# Patient Record
Sex: FEMALE | Race: WHITE | NOT HISPANIC OR LATINO | Employment: UNEMPLOYED | URBAN - METROPOLITAN AREA
[De-identification: names, ages, dates, MRNs, and addresses within clinical notes are randomized per-mention and may not be internally consistent; named-entity substitution may affect disease eponyms.]

---

## 2018-03-18 ENCOUNTER — ANESTHESIA (OUTPATIENT)
Dept: SURGERY | Facility: HOSPITAL | Age: 58
DRG: 329 | End: 2018-03-18
Payer: COMMERCIAL

## 2018-03-18 ENCOUNTER — HOSPITAL ENCOUNTER (INPATIENT)
Facility: HOSPITAL | Age: 58
LOS: 4 days | Discharge: HOME | DRG: 329 | End: 2018-03-22
Attending: INTERNAL MEDICINE | Admitting: SURGERY
Payer: COMMERCIAL

## 2018-03-18 ENCOUNTER — ANESTHESIA EVENT (OUTPATIENT)
Dept: SURGERY | Facility: HOSPITAL | Age: 58
DRG: 329 | End: 2018-03-18
Payer: COMMERCIAL

## 2018-03-18 DIAGNOSIS — K63.1 SMALL BOWEL PERFORATION: ICD-10-CM

## 2018-03-18 DIAGNOSIS — N73.9 PELVIC ABSCESS IN FEMALE: ICD-10-CM

## 2018-03-18 DIAGNOSIS — Q43.0 MECKEL'S DIVERTICULUM: ICD-10-CM

## 2018-03-18 DIAGNOSIS — K63.2 SMALL BOWEL FISTULA: ICD-10-CM

## 2018-03-18 DIAGNOSIS — K52.9 GASTROENTERITIS, ACUTE: Primary | ICD-10-CM

## 2018-03-18 DIAGNOSIS — K63.2 ENTEROENTERIC FISTULA: ICD-10-CM

## 2018-03-18 DIAGNOSIS — N28.1 ACQUIRED RENAL CYST OF RIGHT KIDNEY: ICD-10-CM

## 2018-03-18 PROBLEM — K21.9 GERD (GASTROESOPHAGEAL REFLUX DISEASE): Status: ACTIVE | Noted: 2018-03-18

## 2018-03-18 PROBLEM — R51.9 HEADACHE: Status: ACTIVE | Noted: 2018-03-18

## 2018-03-18 LAB
ALBUMIN SERPL BCP-MCNC: 3.7 G/DL
ALP SERPL-CCNC: 75 U/L
ALT SERPL W/O P-5'-P-CCNC: 12 U/L
ANION GAP SERPL CALC-SCNC: 7 MMOL/L
AST SERPL-CCNC: 20 U/L
BASOPHILS # BLD AUTO: 0.01 K/UL
BASOPHILS NFR BLD: 0.1 %
BILIRUB SERPL-MCNC: 0.8 MG/DL
BILIRUB UR QL STRIP: NEGATIVE
BUN SERPL-MCNC: 12 MG/DL
CALCIUM SERPL-MCNC: 9.1 MG/DL
CHLORIDE SERPL-SCNC: 109 MMOL/L
CLARITY UR: CLEAR
CO2 SERPL-SCNC: 22 MMOL/L
COLOR UR: YELLOW
CREAT SERPL-MCNC: 0.8 MG/DL
DIFFERENTIAL METHOD: ABNORMAL
EOSINOPHIL # BLD AUTO: 0.1 K/UL
EOSINOPHIL NFR BLD: 0.8 %
ERYTHROCYTE [DISTWIDTH] IN BLOOD BY AUTOMATED COUNT: 14 %
EST. GFR  (AFRICAN AMERICAN): >60 ML/MIN/1.73 M^2
EST. GFR  (NON AFRICAN AMERICAN): >60 ML/MIN/1.73 M^2
GLUCOSE SERPL-MCNC: 96 MG/DL
GLUCOSE UR QL STRIP: NEGATIVE
HCT VFR BLD AUTO: 43.4 %
HGB BLD-MCNC: 15.1 G/DL
HGB UR QL STRIP: ABNORMAL
KETONES UR QL STRIP: NEGATIVE
LACTATE SERPL-SCNC: 0.7 MMOL/L
LEUKOCYTE ESTERASE UR QL STRIP: ABNORMAL
LIPASE SERPL-CCNC: 39 U/L
LYMPHOCYTES # BLD AUTO: 1.6 K/UL
LYMPHOCYTES NFR BLD: 10.8 %
MAGNESIUM SERPL-MCNC: 2.2 MG/DL
MCH RBC QN AUTO: 31.5 PG
MCHC RBC AUTO-ENTMCNC: 34.8 G/DL
MCV RBC AUTO: 90 FL
MICROSCOPIC COMMENT: NORMAL
MONOCYTES # BLD AUTO: 1.5 K/UL
MONOCYTES NFR BLD: 10.4 %
NEUTROPHILS # BLD AUTO: 11.4 K/UL
NEUTROPHILS NFR BLD: 77.9 %
NITRITE UR QL STRIP: NEGATIVE
PH UR STRIP: 7 [PH] (ref 5–8)
PLATELET # BLD AUTO: 139 K/UL
PMV BLD AUTO: 9 FL
POTASSIUM SERPL-SCNC: 3.8 MMOL/L
PROT SERPL-MCNC: 6.8 G/DL
PROT UR QL STRIP: NEGATIVE
RBC # BLD AUTO: 4.8 M/UL
RBC #/AREA URNS HPF: 3 /HPF (ref 0–4)
SODIUM SERPL-SCNC: 138 MMOL/L
SP GR UR STRIP: 1.01 (ref 1–1.03)
SQUAMOUS #/AREA URNS HPF: 4 /HPF
URN SPEC COLLECT METH UR: ABNORMAL
UROBILINOGEN UR STRIP-ACNC: NEGATIVE EU/DL
WBC # BLD AUTO: 14.66 K/UL
WBC #/AREA URNS HPF: 0 /HPF (ref 0–5)

## 2018-03-18 PROCEDURE — S0020 INJECTION, BUPIVICAINE HYDRO: HCPCS | Performed by: SURGERY

## 2018-03-18 PROCEDURE — 96361 HYDRATE IV INFUSION ADD-ON: CPT

## 2018-03-18 PROCEDURE — 83735 ASSAY OF MAGNESIUM: CPT

## 2018-03-18 PROCEDURE — 83690 ASSAY OF LIPASE: CPT

## 2018-03-18 PROCEDURE — 88307 TISSUE EXAM BY PATHOLOGIST: CPT | Performed by: PATHOLOGY

## 2018-03-18 PROCEDURE — 63600175 PHARM REV CODE 636 W HCPCS: Performed by: INTERNAL MEDICINE

## 2018-03-18 PROCEDURE — 44120 REMOVAL OF SMALL INTESTINE: CPT | Mod: ,,, | Performed by: SURGERY

## 2018-03-18 PROCEDURE — 36000709 HC OR TIME LEV III EA ADD 15 MIN: Performed by: SURGERY

## 2018-03-18 PROCEDURE — 94760 N-INVAS EAR/PLS OXIMETRY 1: CPT

## 2018-03-18 PROCEDURE — 25000003 PHARM REV CODE 250: Performed by: INTERNAL MEDICINE

## 2018-03-18 PROCEDURE — 36000708 HC OR TIME LEV III 1ST 15 MIN: Performed by: SURGERY

## 2018-03-18 PROCEDURE — 25500020 PHARM REV CODE 255: Performed by: INTERNAL MEDICINE

## 2018-03-18 PROCEDURE — 81000 URINALYSIS NONAUTO W/SCOPE: CPT

## 2018-03-18 PROCEDURE — 96374 THER/PROPH/DIAG INJ IV PUSH: CPT

## 2018-03-18 PROCEDURE — S0030 INJECTION, METRONIDAZOLE: HCPCS | Performed by: SURGERY

## 2018-03-18 PROCEDURE — 83605 ASSAY OF LACTIC ACID: CPT

## 2018-03-18 PROCEDURE — 36415 COLL VENOUS BLD VENIPUNCTURE: CPT

## 2018-03-18 PROCEDURE — 44800 EXCISION OF BOWEL POUCH: CPT | Mod: 51,,, | Performed by: SURGERY

## 2018-03-18 PROCEDURE — 88307 TISSUE EXAM BY PATHOLOGIST: CPT | Mod: 26,,, | Performed by: PATHOLOGY

## 2018-03-18 PROCEDURE — 63600175 PHARM REV CODE 636 W HCPCS: Performed by: NURSE ANESTHETIST, CERTIFIED REGISTERED

## 2018-03-18 PROCEDURE — 71000039 HC RECOVERY, EACH ADD'L HOUR: Performed by: SURGERY

## 2018-03-18 PROCEDURE — 94799 UNLISTED PULMONARY SVC/PX: CPT

## 2018-03-18 PROCEDURE — 96376 TX/PRO/DX INJ SAME DRUG ADON: CPT

## 2018-03-18 PROCEDURE — C9290 INJ, BUPIVACAINE LIPOSOME: HCPCS | Performed by: SURGERY

## 2018-03-18 PROCEDURE — S0028 INJECTION, FAMOTIDINE, 20 MG: HCPCS | Performed by: SURGERY

## 2018-03-18 PROCEDURE — 25000003 PHARM REV CODE 250: Performed by: NURSE ANESTHETIST, CERTIFIED REGISTERED

## 2018-03-18 PROCEDURE — 21400001 HC TELEMETRY ROOM

## 2018-03-18 PROCEDURE — 85025 COMPLETE CBC W/AUTO DIFF WBC: CPT

## 2018-03-18 PROCEDURE — 63600175 PHARM REV CODE 636 W HCPCS: Performed by: SURGERY

## 2018-03-18 PROCEDURE — 63600175 PHARM REV CODE 636 W HCPCS: Performed by: ANESTHESIOLOGY

## 2018-03-18 PROCEDURE — 37000009 HC ANESTHESIA EA ADD 15 MINS: Performed by: SURGERY

## 2018-03-18 PROCEDURE — 25000003 PHARM REV CODE 250: Performed by: SURGERY

## 2018-03-18 PROCEDURE — 71000033 HC RECOVERY, INTIAL HOUR: Performed by: SURGERY

## 2018-03-18 PROCEDURE — 37000008 HC ANESTHESIA 1ST 15 MINUTES: Performed by: SURGERY

## 2018-03-18 PROCEDURE — 80053 COMPREHEN METABOLIC PANEL: CPT

## 2018-03-18 PROCEDURE — 99222 1ST HOSP IP/OBS MODERATE 55: CPT | Mod: 57,,, | Performed by: SURGERY

## 2018-03-18 PROCEDURE — 99900035 HC TECH TIME PER 15 MIN (STAT)

## 2018-03-18 PROCEDURE — 27201423 OPTIME MED/SURG SUP & DEVICES STERILE SUPPLY: Performed by: SURGERY

## 2018-03-18 PROCEDURE — 99285 EMERGENCY DEPT VISIT HI MDM: CPT | Mod: 25

## 2018-03-18 PROCEDURE — 96375 TX/PRO/DX INJ NEW DRUG ADDON: CPT

## 2018-03-18 PROCEDURE — 27000221 HC OXYGEN, UP TO 24 HOURS

## 2018-03-18 PROCEDURE — 11000001 HC ACUTE MED/SURG PRIVATE ROOM

## 2018-03-18 PROCEDURE — 94770 HC EXHALED C02 TEST: CPT

## 2018-03-18 PROCEDURE — 0DBB0ZZ EXCISION OF ILEUM, OPEN APPROACH: ICD-10-PCS | Performed by: SURGERY

## 2018-03-18 RX ORDER — DIPHENHYDRAMINE HYDROCHLORIDE 50 MG/ML
25 INJECTION INTRAMUSCULAR; INTRAVENOUS EVERY 4 HOURS PRN
Status: DISCONTINUED | OUTPATIENT
Start: 2018-03-18 | End: 2018-03-22 | Stop reason: HOSPADM

## 2018-03-18 RX ORDER — CLONIDINE HYDROCHLORIDE 0.1 MG/1
0.1 TABLET ORAL EVERY 6 HOURS PRN
Status: DISCONTINUED | OUTPATIENT
Start: 2018-03-18 | End: 2018-03-22 | Stop reason: HOSPADM

## 2018-03-18 RX ORDER — LIDOCAINE HYDROCHLORIDE 20 MG/ML
INJECTION, SOLUTION EPIDURAL; INFILTRATION; INTRACAUDAL; PERINEURAL
Status: DISCONTINUED | OUTPATIENT
Start: 2018-03-18 | End: 2018-03-18

## 2018-03-18 RX ORDER — CHLORHEXIDINE GLUCONATE ORAL RINSE 1.2 MG/ML
10 SOLUTION DENTAL 2 TIMES DAILY
Status: DISCONTINUED | OUTPATIENT
Start: 2018-03-18 | End: 2018-03-22 | Stop reason: HOSPADM

## 2018-03-18 RX ORDER — ACETAMINOPHEN 325 MG/1
650 TABLET ORAL EVERY 6 HOURS PRN
Status: DISCONTINUED | OUTPATIENT
Start: 2018-03-18 | End: 2018-03-22 | Stop reason: HOSPADM

## 2018-03-18 RX ORDER — NALOXONE HCL 0.4 MG/ML
0.02 VIAL (ML) INJECTION
Status: DISCONTINUED | OUTPATIENT
Start: 2018-03-18 | End: 2018-03-21

## 2018-03-18 RX ORDER — DIPHENHYDRAMINE HYDROCHLORIDE 50 MG/ML
25 INJECTION INTRAMUSCULAR; INTRAVENOUS EVERY 6 HOURS PRN
Status: DISCONTINUED | OUTPATIENT
Start: 2018-03-18 | End: 2018-03-18 | Stop reason: HOSPADM

## 2018-03-18 RX ORDER — PROPOFOL 10 MG/ML
VIAL (ML) INTRAVENOUS
Status: DISCONTINUED | OUTPATIENT
Start: 2018-03-18 | End: 2018-03-18

## 2018-03-18 RX ORDER — SODIUM CHLORIDE 9 MG/ML
1000 INJECTION, SOLUTION INTRAVENOUS
Status: DISCONTINUED | OUTPATIENT
Start: 2018-03-18 | End: 2018-03-18

## 2018-03-18 RX ORDER — FAMOTIDINE 20 MG/50ML
20 INJECTION, SOLUTION INTRAVENOUS EVERY 12 HOURS
Status: DISCONTINUED | OUTPATIENT
Start: 2018-03-18 | End: 2018-03-21

## 2018-03-18 RX ORDER — HYDROMORPHONE HYDROCHLORIDE 2 MG/ML
0.2 INJECTION, SOLUTION INTRAMUSCULAR; INTRAVENOUS; SUBCUTANEOUS EVERY 5 MIN PRN
Status: DISCONTINUED | OUTPATIENT
Start: 2018-03-18 | End: 2018-03-18 | Stop reason: HOSPADM

## 2018-03-18 RX ORDER — HYDROMORPHONE HCL IN 0.9% NACL 6 MG/30 ML
PATIENT CONTROLLED ANALGESIA SYRINGE INTRAVENOUS CONTINUOUS
Status: DISCONTINUED | OUTPATIENT
Start: 2018-03-18 | End: 2018-03-21

## 2018-03-18 RX ORDER — ONDANSETRON 2 MG/ML
INJECTION INTRAMUSCULAR; INTRAVENOUS
Status: DISCONTINUED | OUTPATIENT
Start: 2018-03-18 | End: 2018-03-18

## 2018-03-18 RX ORDER — ENOXAPARIN SODIUM 100 MG/ML
40 INJECTION SUBCUTANEOUS EVERY 24 HOURS
Status: DISCONTINUED | OUTPATIENT
Start: 2018-03-19 | End: 2018-03-22 | Stop reason: HOSPADM

## 2018-03-18 RX ORDER — MIDAZOLAM HYDROCHLORIDE 1 MG/ML
INJECTION, SOLUTION INTRAMUSCULAR; INTRAVENOUS
Status: DISCONTINUED | OUTPATIENT
Start: 2018-03-18 | End: 2018-03-18

## 2018-03-18 RX ORDER — SODIUM CHLORIDE 0.9 % (FLUSH) 0.9 %
3 SYRINGE (ML) INJECTION
Status: DISCONTINUED | OUTPATIENT
Start: 2018-03-18 | End: 2018-03-22 | Stop reason: HOSPADM

## 2018-03-18 RX ORDER — ROCURONIUM BROMIDE 10 MG/ML
INJECTION, SOLUTION INTRAVENOUS
Status: DISCONTINUED | OUTPATIENT
Start: 2018-03-18 | End: 2018-03-18

## 2018-03-18 RX ORDER — HYDROMORPHONE HYDROCHLORIDE 1 MG/ML
1 INJECTION, SOLUTION INTRAMUSCULAR; INTRAVENOUS; SUBCUTANEOUS
Status: COMPLETED | OUTPATIENT
Start: 2018-03-18 | End: 2018-03-18

## 2018-03-18 RX ORDER — INSULIN ASPART 100 [IU]/ML
0-5 INJECTION, SOLUTION INTRAVENOUS; SUBCUTANEOUS EVERY 6 HOURS PRN
Status: DISCONTINUED | OUTPATIENT
Start: 2018-03-18 | End: 2018-03-22 | Stop reason: HOSPADM

## 2018-03-18 RX ORDER — BUPIVACAINE HYDROCHLORIDE 5 MG/ML
INJECTION, SOLUTION EPIDURAL; INTRACAUDAL
Status: DISCONTINUED | OUTPATIENT
Start: 2018-03-18 | End: 2018-03-18 | Stop reason: HOSPADM

## 2018-03-18 RX ORDER — RAMELTEON 8 MG/1
8 TABLET ORAL NIGHTLY PRN
Status: DISCONTINUED | OUTPATIENT
Start: 2018-03-18 | End: 2018-03-22 | Stop reason: HOSPADM

## 2018-03-18 RX ORDER — MEPERIDINE HYDROCHLORIDE 50 MG/ML
12.5 INJECTION INTRAMUSCULAR; INTRAVENOUS; SUBCUTANEOUS EVERY 10 MIN PRN
Status: DISCONTINUED | OUTPATIENT
Start: 2018-03-18 | End: 2018-03-18 | Stop reason: HOSPADM

## 2018-03-18 RX ORDER — PROMETHAZINE HYDROCHLORIDE 25 MG/1
25 SUPPOSITORY RECTAL EVERY 6 HOURS PRN
Status: DISCONTINUED | OUTPATIENT
Start: 2018-03-18 | End: 2018-03-22 | Stop reason: HOSPADM

## 2018-03-18 RX ORDER — NEOSTIGMINE METHYLSULFATE 1 MG/ML
INJECTION, SOLUTION INTRAVENOUS
Status: DISCONTINUED | OUTPATIENT
Start: 2018-03-18 | End: 2018-03-18

## 2018-03-18 RX ORDER — GLYCOPYRROLATE 0.2 MG/ML
INJECTION INTRAMUSCULAR; INTRAVENOUS
Status: DISCONTINUED | OUTPATIENT
Start: 2018-03-18 | End: 2018-03-18

## 2018-03-18 RX ORDER — GLUCAGON 1 MG
1 KIT INJECTION
Status: DISCONTINUED | OUTPATIENT
Start: 2018-03-18 | End: 2018-03-22 | Stop reason: HOSPADM

## 2018-03-18 RX ORDER — SODIUM CHLORIDE 0.9 % (FLUSH) 0.9 %
3 SYRINGE (ML) INJECTION EVERY 8 HOURS
Status: DISCONTINUED | OUTPATIENT
Start: 2018-03-18 | End: 2018-03-18 | Stop reason: HOSPADM

## 2018-03-18 RX ORDER — ONDANSETRON 2 MG/ML
4 INJECTION INTRAMUSCULAR; INTRAVENOUS EVERY 8 HOURS PRN
Status: DISCONTINUED | OUTPATIENT
Start: 2018-03-18 | End: 2018-03-22 | Stop reason: HOSPADM

## 2018-03-18 RX ORDER — SODIUM CHLORIDE, SODIUM LACTATE, POTASSIUM CHLORIDE, CALCIUM CHLORIDE 600; 310; 30; 20 MG/100ML; MG/100ML; MG/100ML; MG/100ML
INJECTION, SOLUTION INTRAVENOUS CONTINUOUS
Status: DISCONTINUED | OUTPATIENT
Start: 2018-03-18 | End: 2018-03-20

## 2018-03-18 RX ORDER — SODIUM CHLORIDE 9 MG/ML
INJECTION, SOLUTION INTRAVENOUS CONTINUOUS PRN
Status: DISCONTINUED | OUTPATIENT
Start: 2018-03-18 | End: 2018-03-18

## 2018-03-18 RX ORDER — ONDANSETRON 2 MG/ML
4 INJECTION INTRAMUSCULAR; INTRAVENOUS DAILY PRN
Status: DISCONTINUED | OUTPATIENT
Start: 2018-03-18 | End: 2018-03-18 | Stop reason: HOSPADM

## 2018-03-18 RX ORDER — FENTANYL CITRATE 50 UG/ML
INJECTION, SOLUTION INTRAMUSCULAR; INTRAVENOUS
Status: DISCONTINUED | OUTPATIENT
Start: 2018-03-18 | End: 2018-03-18

## 2018-03-18 RX ORDER — SUCCINYLCHOLINE CHLORIDE 20 MG/ML
INJECTION INTRAMUSCULAR; INTRAVENOUS
Status: DISCONTINUED | OUTPATIENT
Start: 2018-03-18 | End: 2018-03-18

## 2018-03-18 RX ORDER — ONDANSETRON 8 MG/1
8 TABLET, ORALLY DISINTEGRATING ORAL
Status: COMPLETED | OUTPATIENT
Start: 2018-03-18 | End: 2018-03-18

## 2018-03-18 RX ORDER — ACETAMINOPHEN 10 MG/ML
1000 INJECTION, SOLUTION INTRAVENOUS ONCE
Status: COMPLETED | OUTPATIENT
Start: 2018-03-18 | End: 2018-03-18

## 2018-03-18 RX ORDER — METRONIDAZOLE 500 MG/100ML
500 INJECTION, SOLUTION INTRAVENOUS
Status: DISCONTINUED | OUTPATIENT
Start: 2018-03-18 | End: 2018-03-22 | Stop reason: HOSPADM

## 2018-03-18 RX ADMIN — HYDROMORPHONE HYDROCHLORIDE 0.2 MG: 2 INJECTION INTRAMUSCULAR; INTRAVENOUS; SUBCUTANEOUS at 07:03

## 2018-03-18 RX ADMIN — FAMOTIDINE 20 MG: 20 INJECTION, SOLUTION INTRAVENOUS at 08:03

## 2018-03-18 RX ADMIN — METRONIDAZOLE 500 MG: 500 INJECTION, SOLUTION INTRAVENOUS at 04:03

## 2018-03-18 RX ADMIN — Medication: at 08:03

## 2018-03-18 RX ADMIN — ROCURONIUM BROMIDE 30 MG: 10 INJECTION, SOLUTION INTRAVENOUS at 04:03

## 2018-03-18 RX ADMIN — FENTANYL CITRATE 50 MCG: 50 INJECTION, SOLUTION INTRAMUSCULAR; INTRAVENOUS at 05:03

## 2018-03-18 RX ADMIN — ONDANSETRON 4 MG: 2 INJECTION, SOLUTION INTRAMUSCULAR; INTRAVENOUS at 05:03

## 2018-03-18 RX ADMIN — MEPERIDINE HYDROCHLORIDE 12.5 MG: 50 INJECTION INTRAMUSCULAR; INTRAVENOUS; SUBCUTANEOUS at 06:03

## 2018-03-18 RX ADMIN — ROBINUL 0.6 MG: 0.2 INJECTION INTRAMUSCULAR; INTRAVENOUS at 05:03

## 2018-03-18 RX ADMIN — SODIUM CHLORIDE, POTASSIUM CHLORIDE, SODIUM LACTATE AND CALCIUM CHLORIDE: 600; 310; 30; 20 INJECTION, SOLUTION INTRAVENOUS at 08:03

## 2018-03-18 RX ADMIN — SODIUM CHLORIDE: 0.9 INJECTION, SOLUTION INTRAVENOUS at 04:03

## 2018-03-18 RX ADMIN — BUPIVACAINE 266 MG: 13.3 INJECTION, SUSPENSION, LIPOSOMAL INFILTRATION at 04:03

## 2018-03-18 RX ADMIN — HYDROMORPHONE HYDROCHLORIDE 0.2 MG: 2 INJECTION INTRAMUSCULAR; INTRAVENOUS; SUBCUTANEOUS at 06:03

## 2018-03-18 RX ADMIN — IOHEXOL 30 ML: 350 INJECTION, SOLUTION INTRAVENOUS at 02:03

## 2018-03-18 RX ADMIN — ONDANSETRON 8 MG: 8 TABLET, ORALLY DISINTEGRATING ORAL at 10:03

## 2018-03-18 RX ADMIN — CEFTRIAXONE SODIUM 2 G: 2 INJECTION, POWDER, FOR SOLUTION INTRAMUSCULAR; INTRAVENOUS at 04:03

## 2018-03-18 RX ADMIN — FENTANYL CITRATE 50 MCG: 50 INJECTION, SOLUTION INTRAMUSCULAR; INTRAVENOUS at 04:03

## 2018-03-18 RX ADMIN — HYDROMORPHONE HYDROCHLORIDE 1 MG: 1 INJECTION, SOLUTION INTRAMUSCULAR; INTRAVENOUS; SUBCUTANEOUS at 12:03

## 2018-03-18 RX ADMIN — LIDOCAINE HYDROCHLORIDE 80 MG: 20 INJECTION, SOLUTION EPIDURAL; INFILTRATION; INTRACAUDAL; PERINEURAL at 04:03

## 2018-03-18 RX ADMIN — HYDROMORPHONE HYDROCHLORIDE 1 MG: 1 INJECTION, SOLUTION INTRAMUSCULAR; INTRAVENOUS; SUBCUTANEOUS at 10:03

## 2018-03-18 RX ADMIN — SUCCINYLCHOLINE CHLORIDE 200 MG: 20 INJECTION, SOLUTION INTRAMUSCULAR; INTRAVENOUS at 04:03

## 2018-03-18 RX ADMIN — SODIUM CHLORIDE 1000 ML: 0.9 INJECTION, SOLUTION INTRAVENOUS at 10:03

## 2018-03-18 RX ADMIN — MIDAZOLAM 2 MG: 1 INJECTION INTRAMUSCULAR; INTRAVENOUS at 04:03

## 2018-03-18 RX ADMIN — CHLORHEXIDINE GLUCONATE 10 ML: 1.2 RINSE ORAL at 08:03

## 2018-03-18 RX ADMIN — ACETAMINOPHEN 1000 MG: 10 INJECTION, SOLUTION INTRAVENOUS at 08:03

## 2018-03-18 RX ADMIN — NEOSTIGMINE METHYLSULFATE 4 MG: 1 INJECTION INTRAVENOUS at 05:03

## 2018-03-18 RX ADMIN — PROPOFOL 120 MG: 10 INJECTION, EMULSION INTRAVENOUS at 04:03

## 2018-03-18 RX ADMIN — IOHEXOL 75 ML: 350 INJECTION, SOLUTION INTRAVENOUS at 02:03

## 2018-03-18 NOTE — ED NOTES
Level of Consciousness: The patient is awake, alert, and oriented with appropriate affect and speech; oriented to person, place and time.  Appearance: Sitting up in bed with no acute distress noted. Clothing and hygiene are clean and worn appropriately.  Skin: Skin is warm and dry with good skin turgor; intact; color consistent with ethnicity.  Mucous membranes are moist.   Musculoskeletal: Moves all extremities well in full range of motion. No obvious deformities or swelling noted.  Respiratory: Airway open and patent, respirations spontaneous, even and unlabored. No accessory muscles in use.   Cardiac: Regular rate and rhythm, good pulses palpated peripherally, capillary refill < 3 seconds.  Abdomen: Soft, tender to palpation near lower abdomen. +N/D . No distention noted. Bowel sounds active. Pt rates pain 9/10.   Neurologic: PERRLA, face exhibits symmetrical expression, hand grasps equal and even bilaterally, normal sensation noted to all extremities and face when touched by a finger.

## 2018-03-18 NOTE — ED PROVIDER NOTES
SCRIBE #1 NOTE: I, Sonia Escalante, am scribing for, and in the presence of, Ravi Sykes MD. I have scribed the entire note.      History      Chief Complaint   Patient presents with    Abdominal Pain     c/o abdominal pain, N/V/D that started yesterday        Review of patient's allergies indicates:   Allergen Reactions    Nexium [esomeprazole magnesium] Other (See Comments)     Headache        HPI   HPI    3/18/2018, 10:07 AM   History obtained from the patient and  spouse      History of Present Illness: Larisa Jones is a 57 y.o. female patient who has a PMHx of kidney stones presents to the Emergency Department for lower abdominal pain which onset gradually yesterday. Pt's spouse states pt ate pizza for lunch yesterday when abdominal pain started, and pt has not eaten since. Pt's spouse states pt experienced diarrhea, but it has stopped yesterday. Symptoms are constant and moderate in severity. No mitigating or exacerbating factors reported. Associated sxs include vomiting and nausea. Patient denies any flank pain, pelvic pain, constipation, dysuria, chills, headache, lightheadedness, and all other sxs at this time. No prior Tx included. Pt has no narcotic history within the last year. No further complaints or concerns at this time.       Arrival mode: Personal vehicle    PCP: Tom Love Jr, MD       Past Medical History:  Past Medical History:   Diagnosis Date    GERD (gastroesophageal reflux disease)     Headache        Past Surgical History:  Past Surgical History:   Procedure Laterality Date    CHOLECYSTECTOMY      SHOULDER SURGERY           Family History:  Unknown    Social History:  Social History     Social History Main Topics    Smoking status: Never Smoker    Smokeless tobacco: Unknown    Alcohol use No    Drug use: No    Sexual activity: Unknown       ROS   Review of Systems   Constitutional: Negative for activity change, chills, diaphoresis and fever.   HENT:  Negative for congestion, rhinorrhea and sore throat.    Respiratory: Negative for cough, choking, chest tightness, shortness of breath and stridor.    Cardiovascular: Negative for chest pain and palpitations.   Gastrointestinal: Positive for abdominal pain (lower), diarrhea, nausea and vomiting. Negative for constipation.   Genitourinary: Negative for dysuria, flank pain, frequency, pelvic pain and urgency.   Musculoskeletal: Negative for back pain, myalgias and neck pain.   Skin: Negative for rash.   Neurological: Negative for dizziness, seizures, facial asymmetry, speech difficulty, weakness and numbness.   Hematological: Does not bruise/bleed easily.     Physical Exam      Initial Vitals [03/18/18 0843]   BP Pulse Resp Temp SpO2   102/68 92 18 99.1 °F (37.3 °C) 95 %      MAP       79.33          Physical Exam  Nursing Notes and Vital Signs Reviewed.  Constitutional: Patient is in no acute distress. Well-developed and well-nourished.  Head: Atraumatic. Normocephalic.  Eyes: PERRL. EOM intact. Conjunctivae are not pale. No scleral icterus.  ENT: Mucous membranes are moist. Oropharynx is clear and symmetric.    Neck: Supple. Full ROM. No lymphadenopathy.  Cardiovascular: Regular rate. Regular rhythm. No murmurs, rubs, or gallops.   Pulmonary/Chest: No respiratory distress. Clear to auscultation bilaterally. No wheezing or rales.  Abdominal: Soft and non-distended.  Lower abdominal no tenderness.  No rebound, guarding, or rigidity. Hyperactive bowel sounds.  Genitourinary: No CVA tenderness  Musculoskeletal: Moves all extremities. No obvious deformities.  Skin: Warm and dry.  Neurological:  Alert, awake, and appropriate.  Normal speech.  No acute focal neurological deficits are appreciated.  Psychiatric: Normal affect. Good eye contact. Appropriate in content.     ED Course    Procedures  ED Vital Signs:  Vitals:    03/18/18 1802 03/18/18 1805 03/18/18 1810 03/18/18 1815   BP: (!) 154/76 (!) 146/72 (!) 99/54 (!)  110/54   Pulse: 80 71 67 64   Resp: 18 15 18 19   Temp: 99.1 °F (37.3 °C)      TempSrc: Tympanic      SpO2: 100% 100% 100% 100%   Weight:       Height:        03/18/18 1830 03/18/18 1845 03/18/18 1900 03/18/18 1926   BP: (!) 96/57 (!) 91/52 (!) 98/56 (!) 96/56   Pulse: 61 (!) 58 63 (!) 57   Resp: 14 16 16 15   Temp:       TempSrc:       SpO2: 99% 98% 97% 97%   Weight:       Height:        03/18/18 2000 03/18/18 2020 03/18/18 2336 03/18/18 2341   BP: (!) 99/59 (!) 102/56 (!) 90/54 (!) 122/58   Pulse: 60 62 63 67   Resp: 15 15 16    Temp: 97.5 °F (36.4 °C) 98.3 °F (36.8 °C) 97.9 °F (36.6 °C)    TempSrc: Tympanic Oral Oral    SpO2: 97% 98% 98%    Weight:       Height:        03/19/18 0342 03/19/18 0658 03/19/18 0900   BP: (!) 107/57  (!) 91/55   Pulse: 70  62   Resp: 20 (!) 22 19   Temp: 98.9 °F (37.2 °C)  98.4 °F (36.9 °C)   TempSrc: Oral  Oral   SpO2: 96% 98% 96%   Weight:      Height:          Abnormal Lab Results:  Labs Reviewed   CBC W/ AUTO DIFFERENTIAL - Abnormal; Notable for the following:        Result Value    WBC 14.66 (*)     MCH 31.5 (*)     Platelets 139 (*)     MPV 9.0 (*)     Gran # (ANC) 11.4 (*)     Mono # 1.5 (*)     Gran% 77.9 (*)     Lymph% 10.8 (*)     All other components within normal limits   COMPREHENSIVE METABOLIC PANEL - Abnormal; Notable for the following:     CO2 22 (*)     Anion Gap 7 (*)     All other components within normal limits   URINALYSIS - Abnormal; Notable for the following:     Occult Blood UA 2+ (*)     Leukocytes, UA Trace (*)     All other components within normal limits   MAGNESIUM   URINALYSIS MICROSCOPIC   LIPASE   LIPASE   LACTIC ACID, PLASMA        All Lab Results:  Results for orders placed or performed during the hospital encounter of 03/18/18   CBC auto differential   Result Value Ref Range    WBC 14.66 (H) 3.90 - 12.70 K/uL    RBC 4.80 4.00 - 5.40 M/uL    Hemoglobin 15.1 12.0 - 16.0 g/dL    Hematocrit 43.4 37.0 - 48.5 %    MCV 90 82 - 98 fL    MCH 31.5 (H) 27.0 -  31.0 pg    MCHC 34.8 32.0 - 36.0 g/dL    RDW 14.0 11.5 - 14.5 %    Platelets 139 (L) 150 - 350 K/uL    MPV 9.0 (L) 9.2 - 12.9 fL    Gran # (ANC) 11.4 (H) 1.8 - 7.7 K/uL    Lymph # 1.6 1.0 - 4.8 K/uL    Mono # 1.5 (H) 0.3 - 1.0 K/uL    Eos # 0.1 0.0 - 0.5 K/uL    Baso # 0.01 0.00 - 0.20 K/uL    Gran% 77.9 (H) 38.0 - 73.0 %    Lymph% 10.8 (L) 18.0 - 48.0 %    Mono% 10.4 4.0 - 15.0 %    Eosinophil% 0.8 0.0 - 8.0 %    Basophil% 0.1 0.0 - 1.9 %    Differential Method Automated    Comprehensive metabolic panel   Result Value Ref Range    Sodium 138 136 - 145 mmol/L    Potassium 3.8 3.5 - 5.1 mmol/L    Chloride 109 95 - 110 mmol/L    CO2 22 (L) 23 - 29 mmol/L    Glucose 96 70 - 110 mg/dL    BUN, Bld 12 6 - 20 mg/dL    Creatinine 0.8 0.5 - 1.4 mg/dL    Calcium 9.1 8.7 - 10.5 mg/dL    Total Protein 6.8 6.0 - 8.4 g/dL    Albumin 3.7 3.5 - 5.2 g/dL    Total Bilirubin 0.8 0.1 - 1.0 mg/dL    Alkaline Phosphatase 75 55 - 135 U/L    AST 20 10 - 40 U/L    ALT 12 10 - 44 U/L    Anion Gap 7 (L) 8 - 16 mmol/L    eGFR if African American >60 >60 mL/min/1.73 m^2    eGFR if non African American >60 >60 mL/min/1.73 m^2   Magnesium   Result Value Ref Range    Magnesium 2.2 1.6 - 2.6 mg/dL   Urinalysis   Result Value Ref Range    Specimen UA Urine, Clean Catch     Color, UA Yellow Yellow, Straw, Traci    Appearance, UA Clear Clear    pH, UA 7.0 5.0 - 8.0    Specific Gravity, UA 1.010 1.005 - 1.030    Protein, UA Negative Negative    Glucose, UA Negative Negative    Ketones, UA Negative Negative    Bilirubin (UA) Negative Negative    Occult Blood UA 2+ (A) Negative    Nitrite, UA Negative Negative    Urobilinogen, UA Negative <2.0 EU/dL    Leukocytes, UA Trace (A) Negative   Urinalysis Microscopic   Result Value Ref Range    RBC, UA 3 0 - 4 /hpf    WBC, UA 0 0 - 5 /hpf    Squam Epithel, UA 4 /hpf    Microscopic Comment SEE COMMENT    Lipase   Result Value Ref Range    Lipase 39 4 - 60 U/L   Lactic acid, plasma   Result Value Ref Range     Lactate (Lactic Acid) 0.7 0.5 - 2.2 mmol/L   POCT glucose   Result Value Ref Range    POCT Glucose 78 70 - 110 mg/dL   POCT glucose   Result Value Ref Range    POCT Glucose 72 70 - 110 mg/dL   POCT glucose   Result Value Ref Range    POCT Glucose 81 70 - 110 mg/dL         Imaging Results:  Imaging Results          CT Abdomen Pelvis With Contrast (Final result)  Result time 03/18/18 15:14:22    Final result by Devin Siegel MD (03/18/18 15:14:22)                 Impression:           Small thick walled fluid collection measuring 2.1 x 3.2 x 2.9 cm in the right hemipelvis consistent with an abscess.  Adjacent mildly dilated thick walled loop of bowel containing enteric contrast with 2 abnormal communications to the adjacent ileum.  Differential considerations include focal contained perforation/fistula formation secondary to inflammatory bowel disease or surgery.      All CT scans at this facility use dose modulation, iterative reconstruction and/or weight based dosing when appropriate to reduce radiation dose to as low as reasonably achievable.   Electronically signed by: DEVIN SIEGEL MD  Date:     03/18/18  Time:    15:14              Narrative:    Exam: CT ABDOMEN PELVIS WITH CONTRAST  Technique: Axial CT imaging was performed through the abdomen and pelvis with  75cc  of intravenous contrast. Multiplanar reformats were performed and interpreted.  Enteric contrast was utilized.  Clinical History:  Abdominal pain.  Infection, abdomen-pelvis right pelvis abscess ? bowel perforation     Comparison: CT abdomen and pelvis and pelvic ultrasound performed earlier today.  Findings:       There is a thickwalled fluid collection containing a bubble of air again demonstrated in the right hemipelvis dense abuts a mildly distended loop, thick walled loop of small bowel containing enteric contrast that has an abnormal configuration and communication with the adjacent small bowel loops.  The collection measures 2.1 x 3.2 x 2.9 cm  most consistent with an abscess.  The measurements of the collection on the previous CT exam included the dilated loop of small bowel which is contiguous with the abscess.  The mildly dilated thick walled bowel mentioned above measures approximately 5 cm in length and appears to have 2 fistulous communications with the adjacent normal ileum.  Adjacent surgical clips in the right hemipelvis.  The terminal ileum is within normal limits.                             US Pelvis Complete Non OB (Final result)  Result time 03/18/18 12:52:59    Final result by Devin Siegel MD (03/18/18 12:52:59)                 Impression:     Bowel gas limits evaluation.  The thickwalled fluid collection in the right hemipelvis was identified.  Electronically signed by: DEVIN SIEGEL MD  Date:     03/18/18  Time:    12:52              Narrative:    Exam: US PELVIS COMPLETE NON OB  Clinical History:    Abdominal pain.  Abnormal CT examination.  Findings:     The uterus is normal in size and echotexture with a normal 3 mm endometrial stripe.  1.3 cm intramural posterior uterine wall fibroid.  The right ovary and adnexa was visualized secondary to bowel gas.  Fluid-filled structure with peristalsis adjacent to the left ovary appears to represent a small bowel loop.  The left ovary measures 2.4 x 2.4 x 2.8 cm.  No free pelvic fluid.                             CT Renal Stone Study ABD Pelvis WO (Final result)  Result time 03/18/18 11:26:37    Final result by Devin Siegel MD (03/18/18 11:26:37)                 Impression:           Thickwalled complex fluid collection in the right hemipelvis containing air measuring 4.0 x 5.5 cm suspicious for an abscess.  Differential considerations include a tubo-ovarian abscess or focal small bowel perforation with abscess.  Recommend a repeat study with enteric contrast following at least a 90 minute delay.  Also, an ultrasound may be helpful to evaluate the fluid collections relationship with the right  ovary.      All CT scans at this facility use dose modulation, iterative reconstruction and/or weight based dosing when appropriate to reduce radiation dose to as low as reasonably achievable.   Electronically signed by: DEVIN PEREZ MD  Date:     03/18/18  Time:    11:26              Narrative:    Exam: CT RENAL STONE STUDY ABD PELVIS WO  Technique: Axial CT images performed through the abdomen and pelvis without intravenous contrast. Multiplanar reformats were performed and interpreted.  Comparison: CT abdomen on October 9, 2016  Clinical History: Abdominal pain. Flank pain, stone disease suspected Hematuria      Findings:       Lung bases are clear.  No urolithiasis, hydronephrosis, or perinephric stranding.  Mildly complex right interpolar renal cyst measuring up to 7 cm.  No hydronephrosis or urolithiasis.  The liver, spleen, adrenal glands, and pancreas have a normal noncontrasted appearance.   The gallbladder is unremarkable.  No free fluid, free air.  The bowel is nondistended and within normal limits.  The appendix is normal.  In the right hemipelvis, there is a thick walled complex fluid collection measuring 4.0 x 5.5 cm containing bubbles of air that is contiguous with the right ovary.  A loop of small bowel is contiguous with the fluid collection.  The fluid collection also abuts the fundus of the uterus.  The abdominal aorta is normal in caliber.  The urinary bladder is unremarkable.     No significant osseous abnormality is identified.                                   The Emergency Provider reviewed the vital signs and test results, which are outlined above.    ED Discussion     11:40 AM: Dr. Sykes discussed the pt's case with Dr. Hernandez (General Surgery) who states bowel looks normal and suggest speaking with radiologist.    2:01 PM: Dr. Worley saw pt and agrees with Dr. Sykes's plan and will wait for CT with contrast.     3:41 PM: Discussed case with Dr. Thom Hernandez (general surgery), who agrees with  current care and management of pt and accepts admission.   Admitting Service: general surgery  Admitting Physician: Dr. Thom Hernandez  Admit to: Surgery    3:41 PM: Re-evaluated pt. I have discussed test results, shared treatment plan, and the need for admission with patient and family at bedside. Pt and family express understanding at this time and agree with all information. All questions answered. Pt and family have no further questions or concerns at this time. Pt is ready for admit.      ED Medication(s):  Medications   cefTRIAXone (ROCEPHIN) 2 g in dextrose 5 % 50 mL IVPB (2 g Intravenous New Bag 3/19/18 0544)   metronidazole IVPB 500 mg (500 mg Intravenous New Bag 3/19/18 0902)   lactated ringers infusion ( Intravenous New Bag 3/18/18 2041)   ramelteon tablet 8 mg (not administered)   chlorhexidine 0.12 % solution 10 mL (10 mLs Mouth/Throat Given 3/19/18 0901)   nozaseptin (NOZIN) nasal  (1 each Each Nare Given 3/18/18 2110)   promethazine suppository 25 mg (not administered)   cloNIDine tablet 0.1 mg (not administered)   dextrose 50% injection 12.5 g (not administered)   glucagon (human recombinant) injection 1 mg (not administered)   diphenhydrAMINE injection 25 mg (not administered)   acetaminophen tablet 650 mg (650 mg Oral Not Given 3/19/18 0528)   naloxone 0.4 mg/mL injection 0.02 mg (not administered)   enoxaparin injection 40 mg (40 mg Subcutaneous Given 3/19/18 0902)   ondansetron injection 4 mg (4 mg Intravenous Given 3/19/18 0654)   insulin aspart U-100 pen 0-5 Units (not administered)   famotidine IVPB 20 mg (20 mg Intravenous New Bag 3/19/18 0902)   HYDROmorphone PCA in 0.9 % NaCl 6 Mg/30 mL (0.2 mg/mL) ( Intravenous Handoff 3/19/18 0729)   sodium chloride 0.9% flush 3 mL (not administered)   acetaminophen (10 mg/mL) injection 1,000 mg (1,000 mg Intravenous New Bag 3/19/18 0654)   sodium chloride 0.9% bolus 1,000 mL (0 mLs Intravenous Stopped 3/18/18 1210)   HYDROmorphone injection 1 mg (1  mg Intravenous Given 3/18/18 1020)   ondansetron disintegrating tablet 8 mg (8 mg Oral Given 3/18/18 1019)   HYDROmorphone injection 1 mg (1 mg Intravenous Given 3/18/18 1236)   omnipaque 350 iohexol 75 mL (75 mLs Intravenous Given 3/18/18 1457)   omnipaque 350 iohexol 30 mL (30 mLs Oral Given 3/18/18 1456)   bupivacaine liposome (PF) 1.3 % (13.3 mg/mL) suspension 266 mg (266 mg Injection Given 3/18/18 1630)   acetaminophen (10 mg/mL) injection 1,000 mg (1,000 mg Intravenous New Bag 3/18/18 2051)       Current Discharge Medication List                Medical Decision Making    Medical Decision Making:   Clinical Tests:   Lab Tests: Ordered and Reviewed  Radiological Study: Ordered and Reviewed           Scribe Attestation:   Scribe #1: I performed the above scribed service and the documentation accurately describes the services I performed. I attest to the accuracy of the note.    Attending:   Physician Attestation Statement for Scribe #1: I, Ravi Sykes MD, personally performed the services described in this documentation, as scribed by Sonia Escalante, in my presence, and it is both accurate and complete.          Clinical Impression       ICD-10-CM ICD-9-CM   1. Gastroenteritis, acute K52.9 558.9   2. Acquired renal cyst of right kidney N28.1 593.2   3. Small bowel perforation K63.1 569.83   4. 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 Meckel's diverticulum Q43.0 751.0       Disposition:   Disposition: Discharged  Condition: Fair         Ravi Sykes MD  03/19/18 2616

## 2018-03-18 NOTE — SUBJECTIVE & OBJECTIVE
No current facility-administered medications on file prior to encounter.      Current Outpatient Prescriptions on File Prior to Encounter   Medication Sig    hydrocodone-acetaminophen 7.5-325mg (NORCO) 7.5-325 mg per tablet Take 1 tablet by mouth every 6 (six) hours as needed.    gabapentin (NEURONTIN) 100 MG capsule Take 100 mg by mouth 3 (three) times daily.    ketorolac (TORADOL) 10 mg tablet Take 1 tablet (10 mg total) by mouth every 8 (eight) hours as needed for Pain (pain).    ondansetron (ZOFRAN) 4 MG tablet Take 1 tablet (4 mg total) by mouth every 8 (eight) hours as needed.    pantoprazole (PROTONIX) 40 MG tablet Take 40 mg by mouth once daily.    tamsulosin (FLOMAX) 0.4 mg Cp24 Take 1 capsule (0.4 mg total) by mouth once daily.    TOPIRAMATE (TOPAMAX ORAL) Take by mouth.    tramadol (ULTRAM) 50 mg tablet Take 50 mg by mouth every 6 (six) hours as needed for Pain.       Review of patient's allergies indicates:   Allergen Reactions    Nexium [esomeprazole magnesium] Other (See Comments)     Headache       Past Medical History:   Diagnosis Date    GERD (gastroesophageal reflux disease)     Headache      Past Surgical History:   Procedure Laterality Date    CHOLECYSTECTOMY      SHOULDER SURGERY       Family History     None        Social History Main Topics    Smoking status: Never Smoker    Smokeless tobacco: Not on file    Alcohol use No    Drug use: No    Sexual activity: Not on file     Review of Systems   Constitutional: Negative for chills, fatigue, fever and unexpected weight change.   Respiratory: Negative for cough, shortness of breath, wheezing and stridor.    Cardiovascular: Negative for chest pain, palpitations and leg swelling.   Gastrointestinal: Positive for abdominal pain, diarrhea, nausea and vomiting. Negative for abdominal distention and constipation.   Genitourinary: Negative for difficulty urinating, dysuria, frequency, hematuria and urgency.   Skin: Negative for color  change, pallor, rash and wound.   Hematological: Does not bruise/bleed easily.     Objective:     Vital Signs (Most Recent):  Temp: 98.4 °F (36.9 °C) (03/18/18 1237)  Pulse: 67 (03/18/18 1521)  Resp: 17 (03/18/18 1042)  BP: (!) 109/58 (03/18/18 1237)  SpO2: 97 % (03/18/18 1521) Vital Signs (24h Range):  Temp:  [98.4 °F (36.9 °C)-99.1 °F (37.3 °C)] 98.4 °F (36.9 °C)  Pulse:  [67-92] 67  Resp:  [17-18] 17  SpO2:  [95 %-98 %] 97 %  BP: (102-109)/(58-68) 109/58     Weight: 63.5 kg (140 lb)  Body mass index is 25.61 kg/m².    Physical Exam   Constitutional: She is oriented to person, place, and time. She appears well-developed and well-nourished.   HENT:   Head: Normocephalic and atraumatic.   Eyes: EOM are normal.   Neck: Neck supple.   Cardiovascular: Normal rate and regular rhythm.    Pulmonary/Chest: Effort normal and breath sounds normal.   Abdominal: Soft. Bowel sounds are normal. She exhibits no distension. There is tenderness (right lower quadrant).   Neurological: She is alert and oriented to person, place, and time.   Skin: Skin is warm and dry.   Vitals reviewed.      Significant Labs:  CBC:   Recent Labs  Lab 03/18/18  0930   WBC 14.66*   RBC 4.80   HGB 15.1   HCT 43.4   *   MCV 90   MCH 31.5*   MCHC 34.8     BMP:   Recent Labs  Lab 03/18/18  0930   GLU 96      K 3.8      CO2 22*   BUN 12   CREATININE 0.8   CALCIUM 9.1   MG 2.2       Significant Diagnostics:  CT:  Small thick walled fluid collection measuring 2.1 x 3.2 x 2.9 cm in the right hemipelvis consistent with an abscess.  Adjacent mildly dilated thick walled loop of bowel containing enteric contrast with 2 abnormal communications to the adjacent ileum.  Differential considerations include focal contained perforation/fistula formation secondary to inflammatory bowel disease or surgery.

## 2018-03-18 NOTE — HPI
57-year-old female referred for pelvic abscess with fistulous connection to small bowel concerning for perforation.  The patient presented with lower abdominal pain which she said began after eating pizza yesterday.  She she reports nausea, vomiting, diarrhea that began after eating pizza and her lower abdominal pain worsen.  Denies any fever/chills, dysuria or any other complaints.

## 2018-03-18 NOTE — H&P
[facility] -   General Surgery  History & Physical    Patient Name: Larisa Jones  MRN: [id number]  Admission Date: 3/18/2018  Attending Physician: Ravi Sykes MD   Primary Care Provider: Tom Love Jr, MD    Patient information was obtained from patient.     Subjective:     Chief Complaint/Reason for Admission: ?small bowel fistula/pelvic abscess    History of Present Illness: 57-year-old female referred for pelvic abscess with fistulous connection to small bowel concerning for perforation.  The patient presented with lower abdominal pain which she said began after eating pizza yesterday.  She she reports nausea, vomiting, diarrhea that began after eating pizza and her lower abdominal pain worsen.  Denies any fever/chills, dysuria or any other complaints.    No current facility-administered medications on file prior to encounter.      Current Outpatient Prescriptions on File Prior to Encounter   Medication Sig    hydrocodone-acetaminophen 7.5-325mg (NORCO) 7.5-325 mg per tablet Take 1 tablet by mouth every 6 (six) hours as needed.    gabapentin (NEURONTIN) 100 MG capsule Take 100 mg by mouth 3 (three) times daily.    ketorolac (TORADOL) 10 mg tablet Take 1 tablet (10 mg total) by mouth every 8 (eight) hours as needed for Pain (pain).    ondansetron (ZOFRAN) 4 MG tablet Take 1 tablet (4 mg total) by mouth every 8 (eight) hours as needed.    pantoprazole (PROTONIX) 40 MG tablet Take 40 mg by mouth once daily.    tamsulosin (FLOMAX) 0.4 mg Cp24 Take 1 capsule (0.4 mg total) by mouth once daily.    TOPIRAMATE (TOPAMAX ORAL) Take by mouth.    tramadol (ULTRAM) 50 mg tablet Take 50 mg by mouth every 6 (six) hours as needed for Pain.       Review of patient's allergies indicates:   Allergen Reactions    Nexium [esomeprazole magnesium] Other (See Comments)     Headache       Past Medical History:   Diagnosis Date    GERD (gastroesophageal reflux disease)     Headache       Past Surgical History:   Procedure Laterality Date    CHOLECYSTECTOMY      SHOULDER SURGERY       Family History     None        Social History Main Topics    Smoking status: Never Smoker    Smokeless tobacco: Not on file    Alcohol use No    Drug use: No    Sexual activity: Not on file     Review of Systems   Constitutional: Negative for chills, fatigue, fever and unexpected weight change.   Respiratory: Negative for cough, shortness of breath, wheezing and stridor.    Cardiovascular: Negative for chest pain, palpitations and leg swelling.   Gastrointestinal: Positive for abdominal pain, diarrhea, nausea and vomiting. Negative for abdominal distention and constipation.   Genitourinary: Negative for difficulty urinating, dysuria, frequency, hematuria and urgency.   Skin: Negative for color change, pallor, rash and wound.   Hematological: Does not bruise/bleed easily.     Objective:     Vital Signs (Most Recent):  Temp: 98.4 °F (36.9 °C) (03/18/18 1237)  Pulse: 67 (03/18/18 1521)  Resp: 17 (03/18/18 1042)  BP: (!) 109/58 (03/18/18 1237)  SpO2: 97 % (03/18/18 1521) Vital Signs (24h Range):  Temp:  [98.4 °F (36.9 °C)-99.1 °F (37.3 °C)] 98.4 °F (36.9 °C)  Pulse:  [67-92] 67  Resp:  [17-18] 17  SpO2:  [95 %-98 %] 97 %  BP: (102-109)/(58-68) 109/58     Weight: 63.5 kg (140 lb)  Body mass index is 25.61 kg/m².    Physical Exam   Constitutional: She is oriented to person, place, and time. She appears well-developed and well-nourished.   HENT:   Head: Normocephalic and atraumatic.   Eyes: EOM are normal.   Neck: Neck supple.   Cardiovascular: Normal rate and regular rhythm.    Pulmonary/Chest: Effort normal and breath sounds normal.   Abdominal: Soft. Bowel sounds are normal. She exhibits no distension. There is tenderness (right lower quadrant).   Neurological: She is alert and oriented to person, place, and time.   Skin: Skin is warm and dry.   Vitals reviewed.      Significant Labs:  CBC:   Recent Labs  Lab  03/18/18  0930   WBC 14.66*   RBC 4.80   HGB 15.1   HCT 43.4   *   MCV 90   MCH 31.5*   MCHC 34.8     BMP:   Recent Labs  Lab 03/18/18  0930   GLU 96      K 3.8      CO2 22*   BUN 12   CREATININE 0.8   CALCIUM 9.1   MG 2.2       Significant Diagnostics:  CT:  Small thick walled fluid collection measuring 2.1 x 3.2 x 2.9 cm in the right hemipelvis consistent with an abscess.  Adjacent mildly dilated thick walled loop of bowel containing enteric contrast with 2 abnormal communications to the adjacent ileum.  Differential considerations include focal contained perforation/fistula formation secondary to inflammatory bowel disease or surgery.    Assessment/Plan:     Small bowel fistula    To OR for exploratory laparotomy with possible small bowel resection, washout of pelvic abscess, possible drain placement  IV antibiotics, IV fluids, nothing by mouth  Risks and benefits discussed with patient including: Pain, bleeding, infection, injury to underlying abdominal organs, leak or stricture, need for further procedure.          VTE Risk Mitigation     None          Thom Hernandez MD  General Surgery  [facility] -

## 2018-03-18 NOTE — ASSESSMENT & PLAN NOTE
To OR for exploratory laparotomy with possible small bowel resection, washout of pelvic abscess, possible drain placement  IV antibiotics, IV fluids, nothing by mouth  Risks and benefits discussed with patient including: Pain, bleeding, infection, injury to underlying abdominal organs, leak or stricture, need for further procedure.

## 2018-03-18 NOTE — ED NOTES
Pt ambulates to restroom without difficulty. Finished drinking contrast. Will continue to monitor.

## 2018-03-18 NOTE — ANESTHESIA PREPROCEDURE EVALUATION
03/18/2018  Larisa Jones is a 57 y.o., female.    Anesthesia Evaluation    I have reviewed the Patient Summary Reports.        Review of Systems  Anesthesia Hx:  Denies Hx of Anesthetic complications  Denies Family Hx of Anesthesia complications.   Denies Personal Hx of Anesthesia complications.   Hematology/Oncology:     Oncology Normal    -- Denies Anemia:   EENT/Dental:EENT/Dental Normal   Cardiovascular:  Cardiovascular Normal Exercise tolerance: good   Functional Capacity good / => 4 METS  Denies Coronary Artery Disease.  Denies Valvular Heart Disease.    Denies Congestive Heart Failure (CHF)    Pulmonary:  Pulmonary Normal  Denies Pulmonary Symptoms.    Renal/:   renal calculi    Hepatic/GI:   GERD, well controlled  Bowel Conditions:  Perforated Viscus    Musculoskeletal:  Musculoskeletal Normal  Denies Musculoskeletal General/Symptoms  Denies Bone Disorder    Neurological:   Headaches  Dx of Headaches, Migraine Headache   Endocrine:  Endocrine Normal  Denies Diabetes  Denies Thyroid Disease    Dermatological:  Skin Normal    Psych:  Psychiatric Normal           Physical Exam  General:  Well nourished    Airway/Jaw/Neck:  Airway Findings: Mouth Opening: Normal Tongue: Normal  General Airway Assessment: Adult  Mallampati: II  TM Distance: Normal, at least 6 cm       Chest/Lungs:  Chest/Lungs Findings: Normal Respiratory Rate     Heart/Vascular:  Heart Findings: Rate: Normal        Mental Status:  Mental Status Findings:  Cooperative         Anesthesia Plan  Type of Anesthesia, risks & benefits discussed:  Anesthesia Type:  general  Patient's Preference:   Intra-op Monitoring Plan:   Intra-op Monitoring Plan Comments:   Post Op Pain Control Plan: multimodal analgesia  Post Op Pain Control Plan Comments:   Induction:   IV  Beta Blocker:  Patient is not currently on a Beta-Blocker (No further  documentation required).       Informed Consent: Patient understands risks and agrees with Anesthesia plan.  Questions answered. Anesthesia consent signed with patient.  ASA Score: 2  emergent   Day of Surgery Review of History & Physical:            Ready For Surgery From Anesthesia Perspective.

## 2018-03-18 NOTE — TRANSFER OF CARE
"Anesthesia Transfer of Care Note    Patient: Larisa Jones    Procedure(s) Performed: Procedure(s) (LRB):  EXPLORATORY-LAPAROTOMY (N/A)  RESECTION-SMALL BOWEL MECKEL'S DIVERTICULUM    Patient location: PACU    Anesthesia Type: general    Transport from OR: Transported from OR on room air with adequate spontaneous ventilation    Post pain: adequate analgesia    Post assessment: no apparent anesthetic complications    Post vital signs: stable    Level of consciousness: awake    Nausea/Vomiting: no nausea/vomiting    Complications: none    Transfer of care protocol was followed      Last vitals:   Visit Vitals  BP (!) 154/76 (BP Location: Right arm, Patient Position: Lying)   Pulse 80   Temp 37.3 °C (99.1 °F) (Tympanic)   Resp 18   Ht 5' 2" (1.575 m)   Wt 63.5 kg (140 lb)   SpO2 100%   BMI 25.61 kg/m²     "

## 2018-03-19 PROBLEM — R51.9 HEADACHE: Status: RESOLVED | Noted: 2018-03-18 | Resolved: 2018-03-19

## 2018-03-19 PROBLEM — E87.6 HYPOKALEMIA: Status: ACTIVE | Noted: 2018-03-19

## 2018-03-19 LAB
ANION GAP SERPL CALC-SCNC: 9 MMOL/L
BASOPHILS # BLD AUTO: 0.02 K/UL
BASOPHILS NFR BLD: 0.2 %
BILIRUB UR QL STRIP: NEGATIVE
BUN SERPL-MCNC: 8 MG/DL
CALCIUM SERPL-MCNC: 8.3 MG/DL
CHLORIDE SERPL-SCNC: 109 MMOL/L
CLARITY UR: CLEAR
CO2 SERPL-SCNC: 20 MMOL/L
COLOR UR: YELLOW
CREAT SERPL-MCNC: 0.6 MG/DL
DIFFERENTIAL METHOD: ABNORMAL
EOSINOPHIL # BLD AUTO: 0.1 K/UL
EOSINOPHIL NFR BLD: 0.5 %
ERYTHROCYTE [DISTWIDTH] IN BLOOD BY AUTOMATED COUNT: 14 %
EST. GFR  (AFRICAN AMERICAN): >60 ML/MIN/1.73 M^2
EST. GFR  (NON AFRICAN AMERICAN): >60 ML/MIN/1.73 M^2
GLUCOSE SERPL-MCNC: 78 MG/DL
GLUCOSE UR QL STRIP: NEGATIVE
HCT VFR BLD AUTO: 37.1 %
HGB BLD-MCNC: 12.4 G/DL
HGB UR QL STRIP: ABNORMAL
KETONES UR QL STRIP: ABNORMAL
LEUKOCYTE ESTERASE UR QL STRIP: NEGATIVE
LYMPHOCYTES # BLD AUTO: 1 K/UL
LYMPHOCYTES NFR BLD: 9 %
MCH RBC QN AUTO: 30.8 PG
MCHC RBC AUTO-ENTMCNC: 33.4 G/DL
MCV RBC AUTO: 92 FL
MICROSCOPIC COMMENT: ABNORMAL
MONOCYTES # BLD AUTO: 1.2 K/UL
MONOCYTES NFR BLD: 10.9 %
NEUTROPHILS # BLD AUTO: 8.9 K/UL
NEUTROPHILS NFR BLD: 79.4 %
NITRITE UR QL STRIP: NEGATIVE
PH UR STRIP: 6 [PH] (ref 5–8)
PLATELET # BLD AUTO: 149 K/UL
PMV BLD AUTO: 9.6 FL
POCT GLUCOSE: 58 MG/DL (ref 70–110)
POCT GLUCOSE: 68 MG/DL (ref 70–110)
POCT GLUCOSE: 69 MG/DL (ref 70–110)
POCT GLUCOSE: 69 MG/DL (ref 70–110)
POCT GLUCOSE: 72 MG/DL (ref 70–110)
POCT GLUCOSE: 78 MG/DL (ref 70–110)
POCT GLUCOSE: 81 MG/DL (ref 70–110)
POTASSIUM SERPL-SCNC: 3.4 MMOL/L
PROT UR QL STRIP: NEGATIVE
RBC # BLD AUTO: 4.03 M/UL
RBC #/AREA URNS HPF: 8 /HPF (ref 0–4)
SODIUM SERPL-SCNC: 138 MMOL/L
SP GR UR STRIP: 1.02 (ref 1–1.03)
URN SPEC COLLECT METH UR: ABNORMAL
UROBILINOGEN UR STRIP-ACNC: NEGATIVE EU/DL
WBC # BLD AUTO: 11.21 K/UL

## 2018-03-19 PROCEDURE — 21400001 HC TELEMETRY ROOM

## 2018-03-19 PROCEDURE — 27000221 HC OXYGEN, UP TO 24 HOURS

## 2018-03-19 PROCEDURE — G8978 MOBILITY CURRENT STATUS: HCPCS | Mod: CK

## 2018-03-19 PROCEDURE — 94770 HC EXHALED C02 TEST: CPT

## 2018-03-19 PROCEDURE — 94799 UNLISTED PULMONARY SVC/PX: CPT

## 2018-03-19 PROCEDURE — 85025 COMPLETE CBC W/AUTO DIFF WBC: CPT

## 2018-03-19 PROCEDURE — 81000 URINALYSIS NONAUTO W/SCOPE: CPT

## 2018-03-19 PROCEDURE — G8979 MOBILITY GOAL STATUS: HCPCS | Mod: CI

## 2018-03-19 PROCEDURE — 97162 PT EVAL MOD COMPLEX 30 MIN: CPT

## 2018-03-19 PROCEDURE — 94760 N-INVAS EAR/PLS OXIMETRY 1: CPT

## 2018-03-19 PROCEDURE — 63600175 PHARM REV CODE 636 W HCPCS: Performed by: SURGERY

## 2018-03-19 PROCEDURE — 97116 GAIT TRAINING THERAPY: CPT

## 2018-03-19 PROCEDURE — 63600175 PHARM REV CODE 636 W HCPCS: Performed by: NURSE PRACTITIONER

## 2018-03-19 PROCEDURE — 25000003 PHARM REV CODE 250: Performed by: SURGERY

## 2018-03-19 PROCEDURE — 86255 FLUORESCENT ANTIBODY SCREEN: CPT | Mod: 91

## 2018-03-19 PROCEDURE — S0030 INJECTION, METRONIDAZOLE: HCPCS | Performed by: SURGERY

## 2018-03-19 PROCEDURE — S0028 INJECTION, FAMOTIDINE, 20 MG: HCPCS | Performed by: SURGERY

## 2018-03-19 PROCEDURE — 36415 COLL VENOUS BLD VENIPUNCTURE: CPT

## 2018-03-19 PROCEDURE — 99900035 HC TECH TIME PER 15 MIN (STAT)

## 2018-03-19 PROCEDURE — 80048 BASIC METABOLIC PNL TOTAL CA: CPT

## 2018-03-19 PROCEDURE — 25000003 PHARM REV CODE 250: Performed by: NURSE PRACTITIONER

## 2018-03-19 RX ORDER — ACETAMINOPHEN 10 MG/ML
1000 INJECTION, SOLUTION INTRAVENOUS EVERY 8 HOURS
Status: COMPLETED | OUTPATIENT
Start: 2018-03-19 | End: 2018-03-19

## 2018-03-19 RX ORDER — POTASSIUM CHLORIDE 7.46 G/1000ML
10 INJECTION, SOLUTION INTRAVENOUS 2 TIMES DAILY
Status: COMPLETED | OUTPATIENT
Start: 2018-03-19 | End: 2018-03-20

## 2018-03-19 RX ADMIN — METRONIDAZOLE 500 MG: 500 INJECTION, SOLUTION INTRAVENOUS at 05:03

## 2018-03-19 RX ADMIN — DEXTROSE MONOHYDRATE 12.5 G: 500 INJECTION PARENTERAL at 11:03

## 2018-03-19 RX ADMIN — CEFTRIAXONE SODIUM 2 G: 2 INJECTION, POWDER, FOR SOLUTION INTRAMUSCULAR; INTRAVENOUS at 05:03

## 2018-03-19 RX ADMIN — ACETAMINOPHEN 1000 MG: 10 INJECTION, SOLUTION INTRAVENOUS at 06:03

## 2018-03-19 RX ADMIN — ACETAMINOPHEN 1000 MG: 10 INJECTION, SOLUTION INTRAVENOUS at 11:03

## 2018-03-19 RX ADMIN — POTASSIUM CHLORIDE 10 MEQ: 149 INJECTION, SOLUTION, CONCENTRATE INTRAVENOUS at 12:03

## 2018-03-19 RX ADMIN — Medication: at 05:03

## 2018-03-19 RX ADMIN — ENOXAPARIN SODIUM 40 MG: 100 INJECTION SUBCUTANEOUS at 09:03

## 2018-03-19 RX ADMIN — CHLORHEXIDINE GLUCONATE 10 ML: 1.2 RINSE ORAL at 09:03

## 2018-03-19 RX ADMIN — CHLORHEXIDINE GLUCONATE 10 ML: 1.2 RINSE ORAL at 10:03

## 2018-03-19 RX ADMIN — METRONIDAZOLE 500 MG: 500 INJECTION, SOLUTION INTRAVENOUS at 09:03

## 2018-03-19 RX ADMIN — FAMOTIDINE 20 MG: 20 INJECTION, SOLUTION INTRAVENOUS at 09:03

## 2018-03-19 RX ADMIN — METRONIDAZOLE 500 MG: 500 INJECTION, SOLUTION INTRAVENOUS at 01:03

## 2018-03-19 RX ADMIN — ACETAMINOPHEN 1000 MG: 10 INJECTION, SOLUTION INTRAVENOUS at 02:03

## 2018-03-19 RX ADMIN — FAMOTIDINE 20 MG: 20 INJECTION, SOLUTION INTRAVENOUS at 10:03

## 2018-03-19 RX ADMIN — ONDANSETRON 4 MG: 2 INJECTION INTRAMUSCULAR; INTRAVENOUS at 06:03

## 2018-03-19 NOTE — PLAN OF CARE
Problem: Patient Care Overview  Goal: Plan of Care Review  Outcome: Ongoing (interventions implemented as appropriate)  EtCO2 monitoring in progress to continue for duration of PCA pain medication therapy. Pt is tolerating NC well.Pt educated on Incentive Spirometry; proper procedure/technique, importance of use, and frequency explained.  Performed properly and achieving below adequate levels x 5 breaths with fair effort due to pain and sedation. Follow up to be done by pt independently WA and overseen TID by RT. Modifications of therapy offered to help with pain control and make therapy tolerable.  Pt board in room updated with RT POC.

## 2018-03-19 NOTE — OP NOTE
[facility]  Surgery Department  Operative Note    SUMMARY     Date of Procedure: 3/18/2018     Procedure: Procedure(s) (LRB):  EXPLORATORY-LAPAROTOMY (N/A)  RESECTION-SMALL BOWEL MECKEL'S DIVERTICULUM     Surgeon(s) and Role:     * Thom Hernandez MD - Primary    Assisting Surgeon: None    Pre-Operative Diagnosis: Small bowel perforation [K63.1]    Post-Operative Diagnosis: Post-Op Diagnosis Codes:     inflamed meckle's diverticulum    Anesthesia: General    Technical Procedures Used: small bowel resection    Description of the Findings of the Procedure: inflamed meckel's diverticulum    Complications: No    Estimated Blood Loss (EBL): 20 mL           Implants: * No implants in log *    Specimens:   Specimen (12h ago through future)    Start     Ordered    03/18/18 1718  Specimen to Pathology - Surgery  Once     Comments:  1) small bowel with meckel's diverticulum (perm)Dx: small bowel fistula/ pelvic abscess      03/18/18 1719                  Condition: Good    Disposition: PACU - hemodynamically stable.    Procedure In Detail:  The patient was brought to the OR and underwent general anesthesia. Her abdomen was prepped and draped in the usual sterile fashion. A lower midline incision was made and dissected down with bovie electrocautery. The abdominal cavity was entered and a small wound protector placed. The terminal ileum was grasped and and bowel run noting  A meckel's diverticulum with significant inflammation and inflammatory changes of the mesentery concerning for a contained perforation. This segment was resected using HOWIE 75mm stapler with blue load. Due to the appearance of the mesentery we elected to resect a second short segment of small bowel immediately adjacent to this. The mesentery was divided using the enseal. A sided to side anastomosis was performed using the HOWIE 75 mm stapler with blue loads. The staple line was oversewn with 3-0 silk sutures. The mesenteric defect was closed  using a 3-0 vicryl suture. The abdomen was irrigated and hemostasis noted. The wound protector was removed and exparel injected into the fascia.  The fascia was closed with #1 looped PDS. The wound was irrigated and closed with skin staples. A sterile dressing was placed over the incision and the patient transported to recovery in a stable and satisfactory condition.

## 2018-03-19 NOTE — PLAN OF CARE
Problem: Patient Care Overview  Goal: Plan of Care Review  Outcome: Ongoing (interventions implemented as appropriate)  Patient awake, alert, oriented. Respirations even and unlabored. Patient on 2L of oxygen via nasal canula. Family at bedside. NPO. Patient remained free of falls on this shift. Midline incision to abdomen with 4x4 and mediport tape, C/D/I. PIV clean, dry and intact. Patient on PCA pump for pain management. Room free of clutter. Bed locked and in lowest position. Call light within reach.  Safety precautions in place. Side rails up x2. Patient on telemetry monitor. Sinus rhythm on the monitor. POC reviewed. No concerns voiced at this time. Will continue to monitor patient.

## 2018-03-19 NOTE — PT/OT/SLP EVAL
Physical Therapy Evaluation    Patient Name:  Larisa Jones   MRN:  [id number]    Recommendations:     Discharge Recommendations:  nursing facility, skilled, home health PT   Discharge Equipment Recommendations: walker, rolling   Barriers to discharge: None    Assessment:     Larisa Jones is a 57 y.o. female admitted with a medical diagnosis of Small bowel perforation.  She presents with the following impairments/functional limitations:  weakness, impaired endurance, gait instability, impaired functional mobilty   .    Rehab Prognosis:  good; patient would benefit from acute skilled PT services to address these deficits and reach maximum level of function.      Recent Surgery: Procedure(s) (LRB):  EXPLORATORY-LAPAROTOMY (N/A)  RESECTION-SMALL BOWEL MECKEL'S DIVERTICULUM (N/A) 1 Day Post-Op    Plan:     During this hospitalization, patient to be seen 5 x/week to address the above listed problems via gait training, therapeutic activities, therapeutic exercises  · Plan of Care Expires:      Plan of Care Reviewed with: patient, spouse    Subjective     Communicated with [facility] prior to session.  Patient found supine in bed upon PT entry to room, agreeable to evaluation.      Chief Complaint: pain with mobility and decreased mobility  Patient comments/goals: return to prior levl of fun  Pain/Comfort:  · Pain Rating 1: 5/10  · Location 1: abdomen  · Pain Addressed 1: Reposition, Cessation of Activity    Patients cultural, spiritual, Buddhism conflicts given the current situation:      Living Environment:  Lives with spouse with steps to enter home  Prior to admission, patients level of function was I.  Patient has the following equipment: none.  DME owned (not currently used): none.  Upon discharge, patient will have assistance from .    Objective:     Patient found with:       General Precautions: Standard,     Orthopedic Precautions:    Braces:       Exams:  · Strength 3+/5    Functional  Mobility:  · Sit to supine with HOB eleveted with Giuseppe, w/o HOB elevated max a and increased pain  · T/fs and sit to stand from eleveted surface with giuseppe\gait with RW with min A shufflung gait, decreased control of RW wide path    AM-PAC 6 CLICK MOBILITY  Total Score:13       Therapeutic Activities and Exercises:   Pt impulsive decreased immediate stand balance.  Pt with slow rxn to commands    Patient left HOB elevated with all lines intact, call button in reach and  present.    GOALS:    Physical Therapy Goals        Problem: Physical Therapy Goal    Goal Priority Disciplines Outcome Goal Variances Interventions   Physical Therapy Goal     PT/OT, PT      Description:  In one week pt will be able to:  1 sit to/from supine with giuseppe  2. T/f with SPV  3. Gait w or w/o  ft with SPV                    History:     Past Medical History:   Diagnosis Date    GERD (gastroesophageal reflux disease)     Headache        Past Surgical History:   Procedure Laterality Date    CHOLECYSTECTOMY      SHOULDER SURGERY         Clinical Decision Making:     History  Co-morbidities and personal factors that may impact the plan of care Examination  Body Structures and Functions, activity limitations and participation restrictions that may impact the plan of care Clinical Presentation   Decision Making/ Complexity Score   Co-morbidities:   [] Time since onset of injury / illness / exacerbation  [] Status of current condition  []Patient's cognitive status and safety concerns    [] Multiple Medical Problems (see med hx)  Personal Factors:   [] Patient's age  [] Prior Level of function   [] Patient's home situation (environment and family support)  [] Patient's level of motivation  [] Expected progression of patient      HISTORY:(criteria)    [] 35069 - no personal factors/history    [] 94135 - has 1-2 personal factor/comorbidity     [] 58473 - has >3 personal factor/comorbidity     Body Regions:  [] Objective examination  findings  [] Head     []  Neck  [] Trunk   [] Upper Extremity  [] Lower Extremity    Body Systems:  [] For communication ability, affect, cognition, language, and learning style: the assessment of the ability to make needs known, consciousness, orientation (person, place, and time), expected emotional /behavioral responses, and learning preferences (eg, learning barriers, education  needs)  [] For the neuromuscular system: a general assessment of gross coordinated movement (eg, balance, gait, locomotion, transfers, and transitions) and motor function  (motor control and motor learning)  [] For the musculoskeletal system: the assessment of gross symmetry, gross range of motion, gross strength, height, and weight  [] For the integumentary system: the assessment of pliability(texture), presence of scar formation, skin color, and skin integrity  [] For cardiovascular/pulmonary system: the assessment of heart rate, respiratory rate, blood pressure, and edema     Activity limitations:    [] Patient's cognitive status and saf ety concerns          [] Status of current condition      [] Weight bearing restriction  [] Cardiopulmunary Restriction    Participation Restrictions:   [] Goals and goal agreement with the patient     [] Rehab potential (prognosis) and probable outcome      Examination of Body System: (criteria)    [] 51516 - addressing 1-2 elements    [] 11826 - addressing a total of 3 or more elements     [] 29506 -  Addressing a total of 4 or more elements         Clinical Presentation: (criteria)  Choose one     On examination of body system using standardized tests and measures patient presents with (CHOOSE ONE) elements from any of the following: body structures and functions, activity limitations, and/or participation restrictions.  Leading to a clinical presentation that is considered (CHOOSE ONE)                              Clinical Decision Making  (Eval Complexity):  Choose One     Time Tracking:     PT  Received On: 03/19/18  PT Start Time: 1445     PT Stop Time: 1515  PT Total Time (min): 30 min     Billable Minutes: Evaluation 15 and Gait Training 15      Genie Lr, PT  03/19/2018

## 2018-03-19 NOTE — HOSPITAL COURSE
03/19/2018: POD1: pain controlled with PCA, no emesis  03/20/2018: pain improving and controlled. Had small amount of emesis today. Passing gas and tolerating ice chips.   03/21/2018: +bm/flatus, no nausea, pain controlled. Clear liquid diet  3/22/18 tolerating regular diet, + bowel function, stable and ready for discharge

## 2018-03-19 NOTE — PLAN OF CARE
Problem: Patient Care Overview  Goal: Plan of Care Review  Outcome: Ongoing (interventions implemented as appropriate)    Reviewed plan of care, including indications and possible side effects of administered medications. Remains free from injury at this time. Respirations even and unlabored. Bed locked and in lowest position. Call light within reach. Side rails up x2.  Patient verbalized understanding and teach back.   PCA in use,  at bedside.     12 hour chart check complete.

## 2018-03-19 NOTE — ANESTHESIA POSTPROCEDURE EVALUATION
"Anesthesia Post Evaluation    Patient: Larisa Jones    Procedure(s) Performed: Procedure(s) (LRB):  EXPLORATORY-LAPAROTOMY (N/A)  RESECTION-SMALL BOWEL MECKEL'S DIVERTICULUM    Final Anesthesia Type: general  Patient location during evaluation: PACU  Patient participation: Yes- Able to Participate  Level of consciousness: awake and alert  Post-procedure vital signs: reviewed and stable  Pain management: adequate  Airway patency: patent  PONV status at discharge: No PONV  Anesthetic complications: no      Cardiovascular status: hemodynamically stable  Respiratory status: nasal cannula  Hydration status: euvolemic  Follow-up not needed.        Visit Vitals  BP (!) 102/56 (Patient Position: Lying)   Pulse 62   Temp 36.8 °C (98.3 °F) (Oral)   Resp 15   Ht 5' 2" (1.575 m)   Wt 63.5 kg (140 lb)   SpO2 98%   Breastfeeding? No   BMI 25.61 kg/m²       Pain/Gutierrez Score: Pain Assessment Performed: Yes (3/18/2018  8:26 PM)  Presence of Pain: complains of pain/discomfort (3/18/2018  8:26 PM)  Pain Rating Prior to Med Admin: 7 (3/18/2018  8:58 PM)  Pain Rating Post Med Admin: 2 (3/18/2018  7:24 PM)  Gutierrez Score: 10 (3/18/2018  7:24 PM)      "

## 2018-03-19 NOTE — HOSPITAL COURSE
The pt was admitted with pelvic abscess-concerning for SBO with perforation on IV Rocephin and Flagyl. Care discussed with General surgery. Pt underwent Ex Lap and Small bowel resection due to inflamed meckel's diverticulum. Pt tolerated surgery well. No complaints except incisional pain - controlled with PCA. Ambulating.   + Flatus and BM today. Diet advanced  Complain of migraine HA- hx migraines. Topamax restarted. Triptan prn

## 2018-03-19 NOTE — SUBJECTIVE & OBJECTIVE
Past Medical History:   Diagnosis Date    GERD (gastroesophageal reflux disease)     Headache        Past Surgical History:   Procedure Laterality Date    CHOLECYSTECTOMY      SHOULDER SURGERY         Review of patient's allergies indicates:   Allergen Reactions    Nexium [esomeprazole magnesium] Other (See Comments)     Headache       No current facility-administered medications on file prior to encounter.      Current Outpatient Prescriptions on File Prior to Encounter   Medication Sig    hydrocodone-acetaminophen 7.5-325mg (NORCO) 7.5-325 mg per tablet Take 1 tablet by mouth every 6 (six) hours as needed.    gabapentin (NEURONTIN) 100 MG capsule Take 100 mg by mouth 3 (three) times daily.    ketorolac (TORADOL) 10 mg tablet Take 1 tablet (10 mg total) by mouth every 8 (eight) hours as needed for Pain (pain).    ondansetron (ZOFRAN) 4 MG tablet Take 1 tablet (4 mg total) by mouth every 8 (eight) hours as needed.    pantoprazole (PROTONIX) 40 MG tablet Take 40 mg by mouth once daily.    tamsulosin (FLOMAX) 0.4 mg Cp24 Take 1 capsule (0.4 mg total) by mouth once daily.    TOPIRAMATE (TOPAMAX ORAL) Take by mouth.    tramadol (ULTRAM) 50 mg tablet Take 50 mg by mouth every 6 (six) hours as needed for Pain.     Family History     None        Social History Main Topics    Smoking status: Never Smoker    Smokeless tobacco: Not on file    Alcohol use No    Drug use: No    Sexual activity: Not on file     Review of Systems   Constitutional: Positive for fatigue. Negative for chills, diaphoresis, fever and unexpected weight change.   HENT: Negative for congestion, facial swelling, sore throat and trouble swallowing.    Eyes: Negative for photophobia, redness and visual disturbance.   Respiratory: Negative for apnea, cough, chest tightness, shortness of breath and wheezing.    Cardiovascular: Negative for chest pain, palpitations and leg swelling.   Gastrointestinal: Positive for abdominal pain. Negative  for abdominal distention, blood in stool, constipation, diarrhea, nausea and vomiting.   Endocrine: Negative for polydipsia, polyphagia and polyuria.   Genitourinary: Negative for difficulty urinating, dysuria, flank pain, frequency, hematuria and urgency.   Musculoskeletal: Negative for arthralgias, back pain, joint swelling, myalgias and neck stiffness.   Skin: Negative for pallor and rash.   Allergic/Immunologic: Negative for immunocompromised state.   Neurological: Negative for dizziness, tremors, syncope, weakness, light-headedness and headaches.   Psychiatric/Behavioral: Negative for agitation, confusion and suicidal ideas.   All other systems reviewed and are negative.    Objective:     Vital Signs (Most Recent):  Temp: 98.3 °F (36.8 °C) (03/18/18 2020)  Pulse: 62 (03/18/18 2020)  Resp: 15 (03/18/18 2020)  BP: (!) 102/56 (03/18/18 2020)  SpO2: 98 % (03/18/18 2020) Vital Signs (24h Range):  Temp:  [97.5 °F (36.4 °C)-99.1 °F (37.3 °C)] 98.3 °F (36.8 °C)  Pulse:  [57-92] 62  Resp:  [14-19] 15  SpO2:  [95 %-100 %] 98 %  BP: ()/(52-76) 102/56     Weight: 63.5 kg (140 lb)  Body mass index is 25.61 kg/m².    Physical Exam   Constitutional: She is oriented to person, place, and time. She appears well-developed and well-nourished. No distress.   HENT:   Head: Normocephalic and atraumatic.   Eyes: Conjunctivae and EOM are normal. Pupils are equal, round, and reactive to light. No scleral icterus.   Neck: Normal range of motion. Neck supple. No JVD present. No thyromegaly present.   Cardiovascular: Normal rate, regular rhythm and intact distal pulses.  Exam reveals no gallop and no friction rub.    No murmur heard.  Pulmonary/Chest: Effort normal and breath sounds normal. No respiratory distress. She has no wheezes. She has no rales. She exhibits no tenderness.   Abdominal: Soft. Bowel sounds are normal. She exhibits no distension and no mass. There is tenderness. There is guarding.   Musculoskeletal: Normal range  of motion. She exhibits no tenderness.   Neurological: She is oriented to person, place, and time. No cranial nerve deficit.   Skin: Skin is warm and dry. Capillary refill takes 2 to 3 seconds. No rash noted. She is not diaphoretic. No erythema.   Psychiatric: She has a normal mood and affect.   Nursing note and vitals reviewed.        CRANIAL NERVES     CN III, IV, VI   Pupils are equal, round, and reactive to light.  Extraocular motions are normal.        Significant Labs:   Blood Culture: No results for input(s): LABBLOO in the last 48 hours.  BMP:   Recent Labs  Lab 03/18/18  0930   GLU 96      K 3.8      CO2 22*   BUN 12   CREATININE 0.8   CALCIUM 9.1   MG 2.2     CBC:   Recent Labs  Lab 03/18/18  0930   WBC 14.66*   HGB 15.1   HCT 43.4   *     Lactic Acid:   Recent Labs  Lab 03/18/18  1200   LACTATE 0.7     Magnesium:   Recent Labs  Lab 03/18/18  0930   MG 2.2     Urine Studies:   Recent Labs  Lab 03/18/18  0951   COLORU Yellow   APPEARANCEUA Clear   PHUR 7.0   SPECGRAV 1.010   PROTEINUA Negative   GLUCUA Negative   KETONESU Negative   BILIRUBINUA Negative   OCCULTUA 2+*   NITRITE Negative   UROBILINOGEN Negative   LEUKOCYTESUR Trace*   RBCUA 3   WBCUA 0   SQUAMEPITHEL 4     All pertinent labs within the past 24 hours have been reviewed.    Significant Imaging: I have reviewed and interpreted all pertinent imaging results/findings within the past 24 hours.   Imaging Results          CT Abdomen Pelvis With Contrast (Final result)  Result time 03/18/18 15:14:22    Final result by Stuart Siegel MD (03/18/18 15:14:22)                 Impression:           Small thick walled fluid collection measuring 2.1 x 3.2 x 2.9 cm in the right hemipelvis consistent with an abscess.  Adjacent mildly dilated thick walled loop of bowel containing enteric contrast with 2 abnormal communications to the adjacent ileum.  Differential considerations include focal contained perforation/fistula formation secondary to  inflammatory bowel disease or surgery.      All CT scans at this facility use dose modulation, iterative reconstruction and/or weight based dosing when appropriate to reduce radiation dose to as low as reasonably achievable.   Electronically signed by: DEVIN SIEGEL MD  Date:     03/18/18  Time:    15:14              Narrative:    Exam: CT ABDOMEN PELVIS WITH CONTRAST  Technique: Axial CT imaging was performed through the abdomen and pelvis with  75cc  of intravenous contrast. Multiplanar reformats were performed and interpreted.  Enteric contrast was utilized.  Clinical History:  Abdominal pain.  Infection, abdomen-pelvis right pelvis abscess ? bowel perforation     Comparison: CT abdomen and pelvis and pelvic ultrasound performed earlier today.  Findings:       There is a thickwalled fluid collection containing a bubble of air again demonstrated in the right hemipelvis dense abuts a mildly distended loop, thick walled loop of small bowel containing enteric contrast that has an abnormal configuration and communication with the adjacent small bowel loops.  The collection measures 2.1 x 3.2 x 2.9 cm most consistent with an abscess.  The measurements of the collection on the previous CT exam included the dilated loop of small bowel which is contiguous with the abscess.  The mildly dilated thick walled bowel mentioned above measures approximately 5 cm in length and appears to have 2 fistulous communications with the adjacent normal ileum.  Adjacent surgical clips in the right hemipelvis.  The terminal ileum is within normal limits.                             US Pelvis Complete Non OB (Final result)  Result time 03/18/18 12:52:59    Final result by Devin Siegel MD (03/18/18 12:52:59)                 Impression:     Bowel gas limits evaluation.  The thickwalled fluid collection in the right hemipelvis was identified.  Electronically signed by: DEVIN SIEGEL MD  Date:     03/18/18  Time:    12:52              Narrative:     Exam: US PELVIS COMPLETE NON OB  Clinical History:    Abdominal pain.  Abnormal CT examination.  Findings:     The uterus is normal in size and echotexture with a normal 3 mm endometrial stripe.  1.3 cm intramural posterior uterine wall fibroid.  The right ovary and adnexa was visualized secondary to bowel gas.  Fluid-filled structure with peristalsis adjacent to the left ovary appears to represent a small bowel loop.  The left ovary measures 2.4 x 2.4 x 2.8 cm.  No free pelvic fluid.                             CT Renal Stone Study ABD Pelvis WO (Final result)  Result time 03/18/18 11:26:37    Final result by Devin Siegel MD (03/18/18 11:26:37)                 Impression:           Thickwalled complex fluid collection in the right hemipelvis containing air measuring 4.0 x 5.5 cm suspicious for an abscess.  Differential considerations include a tubo-ovarian abscess or focal small bowel perforation with abscess.  Recommend a repeat study with enteric contrast following at least a 90 minute delay.  Also, an ultrasound may be helpful to evaluate the fluid collections relationship with the right ovary.      All CT scans at this facility use dose modulation, iterative reconstruction and/or weight based dosing when appropriate to reduce radiation dose to as low as reasonably achievable.   Electronically signed by: DEVIN SIEGEL MD  Date:     03/18/18  Time:    11:26              Narrative:    Exam: CT RENAL STONE STUDY ABD PELVIS WO  Technique: Axial CT images performed through the abdomen and pelvis without intravenous contrast. Multiplanar reformats were performed and interpreted.  Comparison: CT abdomen on October 9, 2016  Clinical History: Abdominal pain. Flank pain, stone disease suspected Hematuria      Findings:       Lung bases are clear.  No urolithiasis, hydronephrosis, or perinephric stranding.  Mildly complex right interpolar renal cyst measuring up to 7 cm.  No hydronephrosis or urolithiasis.  The liver,  spleen, adrenal glands, and pancreas have a normal noncontrasted appearance.   The gallbladder is unremarkable.  No free fluid, free air.  The bowel is nondistended and within normal limits.  The appendix is normal.  In the right hemipelvis, there is a thick walled complex fluid collection measuring 4.0 x 5.5 cm containing bubbles of air that is contiguous with the right ovary.  A loop of small bowel is contiguous with the fluid collection.  The fluid collection also abuts the fundus of the uterus.  The abdominal aorta is normal in caliber.  The urinary bladder is unremarkable.     No significant osseous abnormality is identified.

## 2018-03-19 NOTE — PLAN OF CARE
CM met with patient/spouse at the bedside to assess for discharge needs.  Patient lives at home with her , Rubin.  She is independent with needs and does not anticipate any discharge needs at this time.  CM provided a transitional care folder, information on advanced directives, information on pharmacy bedside delivery, and discharge planning begins on admission with contact information for any needs/questions.       03/19/18 1341   Discharge Assessment   Assessment Type Discharge Planning Assessment   Confirmed/corrected address and phone number on facesheet? Yes   Assessment information obtained from? Patient;Medical Record;Caregiver   Expected Length of Stay (days) (TBD)   Communicated expected length of stay with patient/caregiver yes   Prior to hospitilization cognitive status: Alert/Oriented   Prior to hospitalization functional status: Independent   Current cognitive status: Alert/Oriented   Current Functional Status: Independent   Facility Arrived From: home   Lives With spouse   Able to Return to Prior Arrangements yes   Is patient able to care for self after discharge? Yes   Who are your caregiver(s) and their phone number(s)? Patient is independent.  Rubin Garcia, spouse  [phone]   Patient's perception of discharge disposition home or selfcare   Readmission Within The Last 30 Days no previous admission in last 30 days   Patient currently being followed by outpatient case management? No   Patient currently receives any other outside agency services? No   Equipment Currently Used at Home none   Do you have any problems affording any of your prescribed medications? No   Is the patient taking medications as prescribed? yes   Does the patient have transportation home? Yes   Transportation Available family or friend will provide   Dialysis Name and Scheduled days NA   Does the patient receive services at the [facility]? No   Discharge Plan A Home with family   Discharge Plan B Home with  family   Patient/Family In Agreement With Plan yes

## 2018-03-19 NOTE — H&P
[facility] Hospital Medicine  History & Physical    Patient Name: Larisa Jones  MRN: [id number]  Admission Date: 3/18/2018  Attending Physician: Felix Quintero MD  Primary Care Provider: Tom Love Jr, MD         Patient information was obtained from patient, spouse/SO and ER records.     Subjective:     Principal Problem:Small bowel fistula    Chief Complaint:   Chief Complaint   Patient presents with    Abdominal Pain     c/o abdominal pain, N/V/D that started yesterday         HPI: 57F h/o GERD and nephrolithiasis presents with lower abdominal pain which began after eating pizza yesterday.  Associated with n/v and decreased oral intake.   Denies fevers, chills, constipation and dysuria.  Reports one episode of watery stool yesterday.  None since then.  No bloody bowel movements.  No other alleviating or exacerbating factors.   She denies family history of IBD.  Does report her mother has history of diverticulosis and fistula.   In Er, CT abdomen show Small thick walled fluid collection measuring 2.1 x 3.2 x 2.9 cm in the right hemipelvis consistent with an abscess.  Adjacent mildly dilated thick walled loop of bowel containing enteric contrast with 2 abnormal communications to the adjacent ileum.    Patient was taken to surgery (dr. Hernandez) for ex lap for washout and possible small bowel resection.  Hospital medicine called for consult on medical management.     Past Medical History:   Diagnosis Date    GERD (gastroesophageal reflux disease)     Headache        Past Surgical History:   Procedure Laterality Date    CHOLECYSTECTOMY      SHOULDER SURGERY         Review of patient's allergies indicates:   Allergen Reactions    Nexium [esomeprazole magnesium] Other (See Comments)     Headache       No current facility-administered medications on file prior to encounter.      Current Outpatient Prescriptions on File Prior to Encounter   Medication Sig     hydrocodone-acetaminophen 7.5-325mg (NORCO) 7.5-325 mg per tablet Take 1 tablet by mouth every 6 (six) hours as needed.    gabapentin (NEURONTIN) 100 MG capsule Take 100 mg by mouth 3 (three) times daily.    ketorolac (TORADOL) 10 mg tablet Take 1 tablet (10 mg total) by mouth every 8 (eight) hours as needed for Pain (pain).    ondansetron (ZOFRAN) 4 MG tablet Take 1 tablet (4 mg total) by mouth every 8 (eight) hours as needed.    pantoprazole (PROTONIX) 40 MG tablet Take 40 mg by mouth once daily.    tamsulosin (FLOMAX) 0.4 mg Cp24 Take 1 capsule (0.4 mg total) by mouth once daily.    TOPIRAMATE (TOPAMAX ORAL) Take by mouth.    tramadol (ULTRAM) 50 mg tablet Take 50 mg by mouth every 6 (six) hours as needed for Pain.     Family History     None        Social History Main Topics    Smoking status: Never Smoker    Smokeless tobacco: Not on file    Alcohol use No    Drug use: No    Sexual activity: Not on file     Review of Systems   Constitutional: Positive for fatigue. Negative for chills, diaphoresis, fever and unexpected weight change.   HENT: Negative for congestion, facial swelling, sore throat and trouble swallowing.    Eyes: Negative for photophobia, redness and visual disturbance.   Respiratory: Negative for apnea, cough, chest tightness, shortness of breath and wheezing.    Cardiovascular: Negative for chest pain, palpitations and leg swelling.   Gastrointestinal: Positive for abdominal pain. Negative for abdominal distention, blood in stool, constipation, diarrhea, nausea and vomiting.   Endocrine: Negative for polydipsia, polyphagia and polyuria.   Genitourinary: Negative for difficulty urinating, dysuria, flank pain, frequency, hematuria and urgency.   Musculoskeletal: Negative for arthralgias, back pain, joint swelling, myalgias and neck stiffness.   Skin: Negative for pallor and rash.   Allergic/Immunologic: Negative for immunocompromised state.   Neurological: Negative for dizziness,  tremors, syncope, weakness, light-headedness and headaches.   Psychiatric/Behavioral: Negative for agitation, confusion and suicidal ideas.   All other systems reviewed and are negative.    Objective:     Vital Signs (Most Recent):  Temp: 98.3 °F (36.8 °C) (03/18/18 2020)  Pulse: 62 (03/18/18 2020)  Resp: 15 (03/18/18 2020)  BP: (!) 102/56 (03/18/18 2020)  SpO2: 98 % (03/18/18 2020) Vital Signs (24h Range):  Temp:  [97.5 °F (36.4 °C)-99.1 °F (37.3 °C)] 98.3 °F (36.8 °C)  Pulse:  [57-92] 62  Resp:  [14-19] 15  SpO2:  [95 %-100 %] 98 %  BP: ()/(52-76) 102/56     Weight: 63.5 kg (140 lb)  Body mass index is 25.61 kg/m².    Physical Exam   Constitutional: She is oriented to person, place, and time. She appears well-developed and well-nourished. No distress.   HENT:   Head: Normocephalic and atraumatic.   Eyes: Conjunctivae and EOM are normal. Pupils are equal, round, and reactive to light. No scleral icterus.   Neck: Normal range of motion. Neck supple. No JVD present. No thyromegaly present.   Cardiovascular: Normal rate, regular rhythm and intact distal pulses.  Exam reveals no gallop and no friction rub.    No murmur heard.  Pulmonary/Chest: Effort normal and breath sounds normal. No respiratory distress. She has no wheezes. She has no rales. She exhibits no tenderness.   Abdominal: Soft. Bowel sounds are normal. She exhibits no distension and no mass. There is tenderness. There is guarding.   Musculoskeletal: Normal range of motion. She exhibits no tenderness.   Neurological: She is oriented to person, place, and time. No cranial nerve deficit.   Skin: Skin is warm and dry. Capillary refill takes 2 to 3 seconds. No rash noted. She is not diaphoretic. No erythema.   Psychiatric: She has a normal mood and affect.   Nursing note and vitals reviewed.        CRANIAL NERVES     CN III, IV, VI   Pupils are equal, round, and reactive to light.  Extraocular motions are normal.        Significant Labs:   Blood Culture:  No results for input(s): LABBLOO in the last 48 hours.  BMP:   Recent Labs  Lab 03/18/18  0930   GLU 96      K 3.8      CO2 22*   BUN 12   CREATININE 0.8   CALCIUM 9.1   MG 2.2     CBC:   Recent Labs  Lab 03/18/18  0930   WBC 14.66*   HGB 15.1   HCT 43.4   *     Lactic Acid:   Recent Labs  Lab 03/18/18  1200   LACTATE 0.7     Magnesium:   Recent Labs  Lab 03/18/18  0930   MG 2.2     Urine Studies:   Recent Labs  Lab 03/18/18  0951   COLORU Yellow   APPEARANCEUA Clear   PHUR 7.0   SPECGRAV 1.010   PROTEINUA Negative   GLUCUA Negative   KETONESU Negative   BILIRUBINUA Negative   OCCULTUA 2+*   NITRITE Negative   UROBILINOGEN Negative   LEUKOCYTESUR Trace*   RBCUA 3   WBCUA 0   SQUAMEPITHEL 4     All pertinent labs within the past 24 hours have been reviewed.    Significant Imaging: I have reviewed and interpreted all pertinent imaging results/findings within the past 24 hours.   Imaging Results          CT Abdomen Pelvis With Contrast (Final result)  Result time 03/18/18 15:14:22    Final result by Devin Siegel MD (03/18/18 15:14:22)                 Impression:           Small thick walled fluid collection measuring 2.1 x 3.2 x 2.9 cm in the right hemipelvis consistent with an abscess.  Adjacent mildly dilated thick walled loop of bowel containing enteric contrast with 2 abnormal communications to the adjacent ileum.  Differential considerations include focal contained perforation/fistula formation secondary to inflammatory bowel disease or surgery.      All CT scans at this facility use dose modulation, iterative reconstruction and/or weight based dosing when appropriate to reduce radiation dose to as low as reasonably achievable.   Electronically signed by: DEVIN SIEGEL MD  Date:     03/18/18  Time:    15:14              Narrative:    Exam: CT ABDOMEN PELVIS WITH CONTRAST  Technique: Axial CT imaging was performed through the abdomen and pelvis with  75cc  of intravenous contrast. Multiplanar  reformats were performed and interpreted.  Enteric contrast was utilized.  Clinical History:  Abdominal pain.  Infection, abdomen-pelvis right pelvis abscess ? bowel perforation     Comparison: CT abdomen and pelvis and pelvic ultrasound performed earlier today.  Findings:       There is a thickwalled fluid collection containing a bubble of air again demonstrated in the right hemipelvis dense abuts a mildly distended loop, thick walled loop of small bowel containing enteric contrast that has an abnormal configuration and communication with the adjacent small bowel loops.  The collection measures 2.1 x 3.2 x 2.9 cm most consistent with an abscess.  The measurements of the collection on the previous CT exam included the dilated loop of small bowel which is contiguous with the abscess.  The mildly dilated thick walled bowel mentioned above measures approximately 5 cm in length and appears to have 2 fistulous communications with the adjacent normal ileum.  Adjacent surgical clips in the right hemipelvis.  The terminal ileum is within normal limits.                             US Pelvis Complete Non OB (Final result)  Result time 03/18/18 12:52:59    Final result by Devin Siegel MD (03/18/18 12:52:59)                 Impression:     Bowel gas limits evaluation.  The thickwalled fluid collection in the right hemipelvis was identified.  Electronically signed by: DEVIN SIEGEL MD  Date:     03/18/18  Time:    12:52              Narrative:    Exam: US PELVIS COMPLETE NON OB  Clinical History:    Abdominal pain.  Abnormal CT examination.  Findings:     The uterus is normal in size and echotexture with a normal 3 mm endometrial stripe.  1.3 cm intramural posterior uterine wall fibroid.  The right ovary and adnexa was visualized secondary to bowel gas.  Fluid-filled structure with peristalsis adjacent to the left ovary appears to represent a small bowel loop.  The left ovary measures 2.4 x 2.4 x 2.8 cm.  No free pelvic fluid.                              CT Renal Stone Study ABD Pelvis WO (Final result)  Result time 03/18/18 11:26:37    Final result by Devin Siegel MD (03/18/18 11:26:37)                 Impression:           Thickwalled complex fluid collection in the right hemipelvis containing air measuring 4.0 x 5.5 cm suspicious for an abscess.  Differential considerations include a tubo-ovarian abscess or focal small bowel perforation with abscess.  Recommend a repeat study with enteric contrast following at least a 90 minute delay.  Also, an ultrasound may be helpful to evaluate the fluid collections relationship with the right ovary.      All CT scans at this facility use dose modulation, iterative reconstruction and/or weight based dosing when appropriate to reduce radiation dose to as low as reasonably achievable.   Electronically signed by: DEVIN SIEGEL MD  Date:     03/18/18  Time:    11:26              Narrative:    Exam: CT RENAL STONE STUDY ABD PELVIS WO  Technique: Axial CT images performed through the abdomen and pelvis without intravenous contrast. Multiplanar reformats were performed and interpreted.  Comparison: CT abdomen on October 9, 2016  Clinical History: Abdominal pain. Flank pain, stone disease suspected Hematuria      Findings:       Lung bases are clear.  No urolithiasis, hydronephrosis, or perinephric stranding.  Mildly complex right interpolar renal cyst measuring up to 7 cm.  No hydronephrosis or urolithiasis.  The liver, spleen, adrenal glands, and pancreas have a normal noncontrasted appearance.   The gallbladder is unremarkable.  No free fluid, free air.  The bowel is nondistended and within normal limits.  The appendix is normal.  In the right hemipelvis, there is a thick walled complex fluid collection measuring 4.0 x 5.5 cm containing bubbles of air that is contiguous with the right ovary.  A loop of small bowel is contiguous with the fluid collection.  The fluid collection also abuts the fundus of  the uterus.  The abdominal aorta is normal in caliber.  The urinary bladder is unremarkable.     No significant osseous abnormality is identified.                                Assessment/Plan:     * Small bowel fistula    Concern for perforation s/p ex lap  Management per surgery  Possible history of IBD, Crohn's more likely than UC  Absent extraintestinal manifestations at this time  CMP within normal limits  Check pANCA, ESR, CRP  Follow up PCP for screening colonoscopy            Headache    Acetaminophen prn headache          GERD (gastroesophageal reflux disease)    Continue PPI            VTE Risk Mitigation         Ordered     enoxaparin injection 40 mg  Daily     Route:  Subcutaneous        03/18/18 2025     IP VTE LOW RISK PATIENT  Once      03/18/18 2025             Felix Quintero MD  Department of Hospital Medicine   [facility]

## 2018-03-19 NOTE — ASSESSMENT & PLAN NOTE
S/p laparotomy with resection meckels diverticulum.   Continue PCA for pain control  Incentive spirometer  Out of bed, PT eval and treat  Continue antibiotics

## 2018-03-19 NOTE — SUBJECTIVE & OBJECTIVE
Interval History: pain controlled with PCA, no nausea or emesis    Medications:  Continuous Infusions:   hydromorphone in 0.9 % NaCl 6 mg/30 ml      lactated ringers 125 mL/hr at 03/18/18 2041     Scheduled Meds:   acetaminophen  1,000 mg Intravenous Q8H    cefTRIAXone (ROCEPHIN) IVPB  2 g Intravenous Q12H    chlorhexidine  10 mL Mouth/Throat BID    enoxaparin  40 mg Subcutaneous Daily    famotidine  20 mg Intravenous Q12H    metronidazole  500 mg Intravenous Q8H    nozaseptin   Each Nare BID     PRN Meds:acetaminophen, cloNIDine, dextrose 50%, diphenhydrAMINE, glucagon (human recombinant), insulin aspart U-100, naloxone, ondansetron, promethazine, ramelteon, sodium chloride 0.9%     Review of patient's allergies indicates:   Allergen Reactions    Nexium [esomeprazole magnesium] Other (See Comments)     Headache     Objective:     Vital Signs (Most Recent):  Temp: 98.9 °F (37.2 °C) (03/19/18 0342)  Pulse: 70 (03/19/18 0342)  Resp: (!) 22 (03/19/18 0658)  BP: (!) 107/57 (03/19/18 0342)  SpO2: 98 % (03/19/18 0658) Vital Signs (24h Range):  Temp:  [97.5 °F (36.4 °C)-99.1 °F (37.3 °C)] 98.9 °F (37.2 °C)  Pulse:  [57-92] 70  Resp:  [14-22] 22  SpO2:  [95 %-100 %] 98 %  BP: ()/(52-76) 107/57     Weight: 63.5 kg (140 lb)  Body mass index is 25.61 kg/m².    Intake/Output - Last 3 Shifts       03/17 0700 - 03/18 0659 03/18 0700 - 03/19 0659 03/19 0700 - 03/20 0659    P.O.  0     I.V. (mL/kg)  1400 (22) 7 (0.1)    IV Piggyback  300     Total Intake(mL/kg)  1700 (26.8) 7 (0.1)    Urine (mL/kg/hr)  1250     Emesis/NG output  250     Blood  30     Total Output   1530      Net   +170 +7                 Physical Exam   Constitutional: She is oriented to person, place, and time. She appears well-developed and well-nourished.   HENT:   Head: Normocephalic and atraumatic.   Eyes: EOM are normal.   Cardiovascular: Normal rate and regular rhythm.    Pulmonary/Chest: Effort normal. No respiratory distress.   Abdominal:  Soft. She exhibits distension (softly). There is tenderness (incisional).   Musculoskeletal: Normal range of motion.   Neurological: She is alert and oriented to person, place, and time.   Skin: Skin is warm and dry.   Psychiatric: She has a normal mood and affect. Thought content normal.   Vitals reviewed.      Significant Labs:  CBC:   Recent Labs  Lab 03/18/18  0930   WBC 14.66*   RBC 4.80   HGB 15.1   HCT 43.4   *   MCV 90   MCH 31.5*   MCHC 34.8     CMP:   Recent Labs  Lab 03/18/18  0930   GLU 96   CALCIUM 9.1   ALBUMIN 3.7   PROT 6.8      K 3.8   CO2 22*      BUN 12   CREATININE 0.8   ALKPHOS 75   ALT 12   AST 20   BILITOT 0.8       Significant Diagnostics:  I have reviewed all pertinent imaging results/findings within the past 24 hours.

## 2018-03-19 NOTE — HPI
57F h/o GERD and nephrolithiasis presents with lower abdominal pain which began after eating pizza yesterday.  Associated with n/v and decreased oral intake.   Denies fevers, chills, constipation and dysuria.  Reports one episode of watery stool yesterday.  None since then.  No bloody bowel movements.  No other alleviating or exacerbating factors.   She denies family history of IBD.  Does report her mother has history of diverticulosis and fistula.   In Er, CT abdomen show Small thick walled fluid collection measuring 2.1 x 3.2 x 2.9 cm in the right hemipelvis consistent with an abscess.  Adjacent mildly dilated thick walled loop of bowel containing enteric contrast with 2 abnormal communications to the adjacent ileum.    Patient was taken to surgery (dr. Hernandez) for ex lap for washout and possible small bowel resection.  Hospital medicine called for consult on medical management.

## 2018-03-19 NOTE — ASSESSMENT & PLAN NOTE
Concern for perforation s/p ex lap  Management per surgery  Possible history of IBD, Crohn's more likely than UC  Absent extraintestinal manifestations at this time  CMP within normal limits  Check pANCA, ESR, CRP  Follow up PCP for screening colonoscopy

## 2018-03-19 NOTE — NURSING
Notified Rusty CONTRERAS patient accucheck 78 patient NPO and no Hx of diabetes. New orders noted to check at blood sugar at 3am and notify him of results.

## 2018-03-19 NOTE — PROGRESS NOTES
[facility] -   General Surgery  Progress Note    Subjective:     History of Present Illness:  57-year-old female referred for pelvic abscess with fistulous connection to small bowel concerning for perforation.  The patient presented with lower abdominal pain which she said began after eating pizza yesterday.  She she reports nausea, vomiting, diarrhea that began after eating pizza and her lower abdominal pain worsen.  Denies any fever/chills, dysuria or any other complaints.    Post-Op Info:  Procedure(s) (LRB):  EXPLORATORY-LAPAROTOMY (N/A)  RESECTION-SMALL BOWEL MECKEL'S DIVERTICULUM   1 Day Post-Op     Interval History: pain controlled with PCA, no nausea or emesis    Medications:  Continuous Infusions:   hydromorphone in 0.9 % NaCl 6 mg/30 ml      lactated ringers 125 mL/hr at 03/18/18 2041     Scheduled Meds:   acetaminophen  1,000 mg Intravenous Q8H    cefTRIAXone (ROCEPHIN) IVPB  2 g Intravenous Q12H    chlorhexidine  10 mL Mouth/Throat BID    enoxaparin  40 mg Subcutaneous Daily    famotidine  20 mg Intravenous Q12H    metronidazole  500 mg Intravenous Q8H    nozaseptin   Each Nare BID     PRN Meds:acetaminophen, cloNIDine, dextrose 50%, diphenhydrAMINE, glucagon (human recombinant), insulin aspart U-100, naloxone, ondansetron, promethazine, ramelteon, sodium chloride 0.9%     Review of patient's allergies indicates:   Allergen Reactions    Nexium [esomeprazole magnesium] Other (See Comments)     Headache     Objective:     Vital Signs (Most Recent):  Temp: 98.9 °F (37.2 °C) (03/19/18 0342)  Pulse: 70 (03/19/18 0342)  Resp: (!) 22 (03/19/18 0658)  BP: (!) 107/57 (03/19/18 0342)  SpO2: 98 % (03/19/18 0658) Vital Signs (24h Range):  Temp:  [97.5 °F (36.4 °C)-99.1 °F (37.3 °C)] 98.9 °F (37.2 °C)  Pulse:  [57-92] 70  Resp:  [14-22] 22  SpO2:  [95 %-100 %] 98 %  BP: ()/(52-76) 107/57     Weight: 63.5 kg (140 lb)  Body mass index is 25.61 kg/m².    Intake/Output - Last 3 Shifts        03/17 0700 - 03/18 0659 03/18 0700 - 03/19 0659 03/19 0700 - 03/20 0659    P.O.  0     I.V. (mL/kg)  1400 (22) 7 (0.1)    IV Piggyback  300     Total Intake(mL/kg)  1700 (26.8) 7 (0.1)    Urine (mL/kg/hr)  1250     Emesis/NG output  250     Blood  30     Total Output   1530      Net   +170 +7                 Physical Exam   Constitutional: She is oriented to person, place, and time. She appears well-developed and well-nourished.   HENT:   Head: Normocephalic and atraumatic.   Eyes: EOM are normal.   Cardiovascular: Normal rate and regular rhythm.    Pulmonary/Chest: Effort normal. No respiratory distress.   Abdominal: Soft. She exhibits distension (softly). There is tenderness (incisional).   Musculoskeletal: Normal range of motion.   Neurological: She is alert and oriented to person, place, and time.   Skin: Skin is warm and dry.   Psychiatric: She has a normal mood and affect. Thought content normal.   Vitals reviewed.      Significant Labs:  CBC:   Recent Labs  Lab 03/18/18  0930   WBC 14.66*   RBC 4.80   HGB 15.1   HCT 43.4   *   MCV 90   MCH 31.5*   MCHC 34.8     CMP:   Recent Labs  Lab 03/18/18  0930   GLU 96   CALCIUM 9.1   ALBUMIN 3.7   PROT 6.8      K 3.8   CO2 22*      BUN 12   CREATININE 0.8   ALKPHOS 75   ALT 12   AST 20   BILITOT 0.8       Significant Diagnostics:  I have reviewed all pertinent imaging results/findings within the past 24 hours.    Assessment/Plan:     * Small bowel fistula    To OR for exploratory laparotomy with possible small bowel resection, washout of pelvic abscess, possible drain placement  IV antibiotics, IV fluids, nothing by mouth  Risks and benefits discussed with patient including: Pain, bleeding, infection, injury to underlying abdominal organs, leak or stricture, need for further procedure.        Meckel's diverticulum    S/p laparotomy with resection meckels diverticulum.   Continue PCA for pain control  Incentive spirometer  Out of bed, PT eval and  treat  Continue antibiotics            Manjula Dover PA-C  General Surgery  [facility]

## 2018-03-19 NOTE — PROGRESS NOTES
[facility] Hospital Medicine  Progress Note    [facility]  [facility] [id number]  Patient Class: IP- Inpatient   Admission Date: 3/18/2018  Length of Stay: 1 days  Attending Physician: Thom Hernandez MD  Primary Care Provider: Tom Love Jr, MD        Subjective:     Principal Problem:Small bowel perforation    HPI:  57F h/o GERD and nephrolithiasis presents with lower abdominal pain which began after eating pizza yesterday.  Associated with n/v and decreased oral intake.   Denies fevers, chills, constipation and dysuria.  Reports one episode of watery stool yesterday.  None since then.  No bloody bowel movements.  No other alleviating or exacerbating factors.   She denies family history of IBD.  Does report her mother has history of diverticulosis and fistula.   In Er, CT abdomen show Small thick walled fluid collection measuring 2.1 x 3.2 x 2.9 cm in the right hemipelvis consistent with an abscess.  Adjacent mildly dilated thick walled loop of bowel containing enteric contrast with 2 abnormal communications to the adjacent ileum.    Patient was taken to surgery (dr. Hernandez) for ex lap for washout and possible small bowel resection.  Hospital medicine called for consult on medical management.     Hospital Course:  The pt was admitted with pelvic abscess-concerning for SBO with perforation on IV Rocephin and Flagyl. Care discussed with General surgery. Pt underwent Ex Lap and Small bowel resection due to  inflamed meckel's diverticulum. Pt tolerated surgery well. No complaints except incisional pain - controlled with PCA. No flatus yet.       Review of Systems   Constitutional: Positive for fatigue. Negative for appetite change, chills, diaphoresis, fever and unexpected weight change.   HENT: Negative for congestion, nosebleeds, sinus pressure and sore throat.    Eyes: Negative for pain, discharge and visual disturbance.   Respiratory: Negative for cough, chest  tightness, shortness of breath, wheezing and stridor.    Cardiovascular: Negative for chest pain, palpitations and leg swelling.   Gastrointestinal: Positive for abdominal pain. Negative for abdominal distention, blood in stool, constipation, diarrhea, nausea and vomiting.   Endocrine: Negative for cold intolerance and heat intolerance.   Genitourinary: Negative for difficulty urinating, dysuria, flank pain, frequency and urgency.   Musculoskeletal: Negative for arthralgias, back pain, joint swelling, myalgias, neck pain and neck stiffness.   Skin: Negative for rash and wound.   Allergic/Immunologic: Negative for food allergies and immunocompromised state.   Neurological: Negative for dizziness, seizures, syncope, facial asymmetry, speech difficulty, weakness, light-headedness, numbness and headaches.   Hematological: Negative for adenopathy.   Psychiatric/Behavioral: Negative for agitation, confusion and hallucinations.     Objective:     Vital Signs (Most Recent):  Temp: 98.3 °F (36.8 °C) (03/19/18 1241)  Pulse: 70 (03/19/18 1349)  Resp: 18 (03/19/18 1241)  BP: (!) 107/58 (03/19/18 1241)  SpO2: 97 % (03/19/18 1241) Vital Signs (24h Range):  Temp:  [97.5 °F (36.4 °C)-99.1 °F (37.3 °C)] 98.3 °F (36.8 °C)  Pulse:  [57-80] 70  Resp:  [14-22] 18  SpO2:  [96 %-100 %] 97 %  BP: ()/(52-76) 107/58     Weight: 63.5 kg (140 lb)  Body mass index is 25.61 kg/m².    Physical Exam   Constitutional: She is oriented to person, place, and time. She appears well-developed and well-nourished. No distress.   HENT:   Head: Normocephalic and atraumatic.   Nose: Nose normal.   Mouth/Throat: Oropharynx is clear and moist.   Eyes: Conjunctivae are normal. No scleral icterus.   Neck: Normal range of motion. Neck supple. No tracheal deviation present.   Cardiovascular: Normal rate, regular rhythm, normal heart sounds and intact distal pulses.  Exam reveals no gallop and no friction rub.    No murmur heard.  Pulmonary/Chest: Effort  normal and breath sounds normal. No stridor. No respiratory distress. She has no wheezes. She has no rales. She exhibits no tenderness.   Abdominal: Soft. Bowel sounds are normal. She exhibits no distension and no mass. There is tenderness (Incisional ). There is no rebound and no guarding.   Musculoskeletal: Normal range of motion. She exhibits no edema, tenderness or deformity.   Neurological: She is alert and oriented to person, place, and time. No cranial nerve deficit. She exhibits normal muscle tone. Coordination normal.   Skin: Skin is warm and dry. No rash noted. She is not diaphoretic. No erythema. No pallor.   Psychiatric: She has a normal mood and affect. Her behavior is normal. Thought content normal.           Significant Labs:   BMP:   Recent Labs  Lab 03/18/18  0930 03/19/18  0852   GLU 96 78    138   K 3.8 3.4*    109   CO2 22* 20*   BUN 12 8   CREATININE 0.8 0.6   CALCIUM 9.1 8.3*   MG 2.2  --      CBC:   Recent Labs  Lab 03/18/18  0930 03/19/18  0852   WBC 14.66* 11.21   HGB 15.1 12.4   HCT 43.4 37.1   * 149*     CMP:   Recent Labs  Lab 03/18/18  0930 03/19/18  0852    138   K 3.8 3.4*    109   CO2 22* 20*   GLU 96 78   BUN 12 8   CREATININE 0.8 0.6   CALCIUM 9.1 8.3*   PROT 6.8  --    ALBUMIN 3.7  --    BILITOT 0.8  --    ALKPHOS 75  --    AST 20  --    ALT 12  --    ANIONGAP 7* 9   EGFRNONAA >60 >60     All pertinent labs within the past 24 hours have been reviewed.    Significant Imaging:  Imaging Results          CT Abdomen Pelvis With Contrast (Final result)  Result time 03/18/18 15:14:22    Final result by Stuart Siegel MD (03/18/18 15:14:22)                 Impression:           Small thick walled fluid collection measuring 2.1 x 3.2 x 2.9 cm in the right hemipelvis consistent with an abscess.  Adjacent mildly dilated thick walled loop of bowel containing enteric contrast with 2 abnormal communications to the adjacent ileum.  Differential considerations include  focal contained perforation/fistula formation secondary to inflammatory bowel disease or surgery.      All CT scans at this facility use dose modulation, iterative reconstruction and/or weight based dosing when appropriate to reduce radiation dose to as low as reasonably achievable.   Electronically signed by: DEVIN SIEGEL MD  Date:     03/18/18  Time:    15:14              Narrative:    Exam: CT ABDOMEN PELVIS WITH CONTRAST  Technique: Axial CT imaging was performed through the abdomen and pelvis with  75cc  of intravenous contrast. Multiplanar reformats were performed and interpreted.  Enteric contrast was utilized.  Clinical History:  Abdominal pain.  Infection, abdomen-pelvis right pelvis abscess ? bowel perforation     Comparison: CT abdomen and pelvis and pelvic ultrasound performed earlier today.  Findings:       There is a thickwalled fluid collection containing a bubble of air again demonstrated in the right hemipelvis dense abuts a mildly distended loop, thick walled loop of small bowel containing enteric contrast that has an abnormal configuration and communication with the adjacent small bowel loops.  The collection measures 2.1 x 3.2 x 2.9 cm most consistent with an abscess.  The measurements of the collection on the previous CT exam included the dilated loop of small bowel which is contiguous with the abscess.  The mildly dilated thick walled bowel mentioned above measures approximately 5 cm in length and appears to have 2 fistulous communications with the adjacent normal ileum.  Adjacent surgical clips in the right hemipelvis.  The terminal ileum is within normal limits.                             US Pelvis Complete Non OB (Final result)  Result time 03/18/18 12:52:59    Final result by Devin Siegel MD (03/18/18 12:52:59)                 Impression:     Bowel gas limits evaluation.  The thickwalled fluid collection in the right hemipelvis was identified.  Electronically signed by: DEVIN SIEGEL  MD  Date:     03/18/18  Time:    12:52              Narrative:    Exam: US PELVIS COMPLETE NON OB  Clinical History:    Abdominal pain.  Abnormal CT examination.  Findings:     The uterus is normal in size and echotexture with a normal 3 mm endometrial stripe.  1.3 cm intramural posterior uterine wall fibroid.  The right ovary and adnexa was visualized secondary to bowel gas.  Fluid-filled structure with peristalsis adjacent to the left ovary appears to represent a small bowel loop.  The left ovary measures 2.4 x 2.4 x 2.8 cm.  No free pelvic fluid.                             CT Renal Stone Study ABD Pelvis WO (Final result)  Result time 03/18/18 11:26:37    Final result by Devin Siegel MD (03/18/18 11:26:37)                 Impression:           Thickwalled complex fluid collection in the right hemipelvis containing air measuring 4.0 x 5.5 cm suspicious for an abscess.  Differential considerations include a tubo-ovarian abscess or focal small bowel perforation with abscess.  Recommend a repeat study with enteric contrast following at least a 90 minute delay.  Also, an ultrasound may be helpful to evaluate the fluid collections relationship with the right ovary.      All CT scans at this facility use dose modulation, iterative reconstruction and/or weight based dosing when appropriate to reduce radiation dose to as low as reasonably achievable.   Electronically signed by: DEVIN SIEGEL MD  Date:     03/18/18  Time:    11:26              Narrative:    Exam: CT RENAL STONE STUDY ABD PELVIS WO  Technique: Axial CT images performed through the abdomen and pelvis without intravenous contrast. Multiplanar reformats were performed and interpreted.  Comparison: CT abdomen on October 9, 2016  Clinical History: Abdominal pain. Flank pain, stone disease suspected Hematuria      Findings:       Lung bases are clear.  No urolithiasis, hydronephrosis, or perinephric stranding.  Mildly complex right interpolar renal cyst  measuring up to 7 cm.  No hydronephrosis or urolithiasis.  The liver, spleen, adrenal glands, and pancreas have a normal noncontrasted appearance.   The gallbladder is unremarkable.  No free fluid, free air.  The bowel is nondistended and within normal limits.  The appendix is normal.  In the right hemipelvis, there is a thick walled complex fluid collection measuring 4.0 x 5.5 cm containing bubbles of air that is contiguous with the right ovary.  A loop of small bowel is contiguous with the fluid collection.  The fluid collection also abuts the fundus of the uterus.  The abdominal aorta is normal in caliber.  The urinary bladder is unremarkable.     No significant osseous abnormality is identified.                            Assessment/Plan:      * Small bowel perforation with abscess due to Meckel's diverticulum s/p small bowel resection     Cont PCA for pain control  Supportive care  PT/OT  Cont IV Rocephin and Flagyl   IS          Hypokalemia    Replete          GERD (gastroesophageal reflux disease)    Continue PPI          Meckel's diverticulum    See above             VTE Risk Mitigation         Ordered     enoxaparin injection 40 mg  Daily     Route:  Subcutaneous        03/18/18 2025     IP VTE LOW RISK PATIENT  Once      03/18/18 2025              Kathi Garcia NP  Department of Hospital Medicine   [facility]

## 2018-03-19 NOTE — NURSING
Accucheck 72 Rusty NP notified. No new orders at this time, just continue to monitor patient and follow protocol under orders if blood sugar drops below 70.

## 2018-03-20 PROBLEM — E16.2 HYPOGLYCEMIA: Status: ACTIVE | Noted: 2018-03-20

## 2018-03-20 LAB
ANION GAP SERPL CALC-SCNC: 6 MMOL/L
BASOPHILS # BLD AUTO: 0.02 K/UL
BASOPHILS NFR BLD: 0.3 %
BUN SERPL-MCNC: 6 MG/DL
CALCIUM SERPL-MCNC: 8.1 MG/DL
CHLORIDE SERPL-SCNC: 109 MMOL/L
CO2 SERPL-SCNC: 22 MMOL/L
CREAT SERPL-MCNC: 0.6 MG/DL
DIFFERENTIAL METHOD: ABNORMAL
EOSINOPHIL # BLD AUTO: 0.2 K/UL
EOSINOPHIL NFR BLD: 3 %
ERYTHROCYTE [DISTWIDTH] IN BLOOD BY AUTOMATED COUNT: 13.9 %
EST. GFR  (AFRICAN AMERICAN): >60 ML/MIN/1.73 M^2
EST. GFR  (NON AFRICAN AMERICAN): >60 ML/MIN/1.73 M^2
GLUCOSE SERPL-MCNC: 68 MG/DL
HCT VFR BLD AUTO: 33 %
HGB BLD-MCNC: 11.3 G/DL
LYMPHOCYTES # BLD AUTO: 1.5 K/UL
LYMPHOCYTES NFR BLD: 19.1 %
MAGNESIUM SERPL-MCNC: 1.6 MG/DL
MCH RBC QN AUTO: 31.4 PG
MCHC RBC AUTO-ENTMCNC: 34.2 G/DL
MCV RBC AUTO: 92 FL
MONOCYTES # BLD AUTO: 1.2 K/UL
MONOCYTES NFR BLD: 15.2 %
NEUTROPHILS # BLD AUTO: 4.8 K/UL
NEUTROPHILS NFR BLD: 62.4 %
PHOSPHATE SERPL-MCNC: 2.5 MG/DL
PLATELET # BLD AUTO: 122 K/UL
PMV BLD AUTO: 8.9 FL
POCT GLUCOSE: 114 MG/DL (ref 70–110)
POCT GLUCOSE: 131 MG/DL (ref 70–110)
POCT GLUCOSE: 62 MG/DL (ref 70–110)
POCT GLUCOSE: 96 MG/DL (ref 70–110)
POTASSIUM SERPL-SCNC: 3.6 MMOL/L
RBC # BLD AUTO: 3.6 M/UL
SODIUM SERPL-SCNC: 137 MMOL/L
WBC # BLD AUTO: 7.69 K/UL

## 2018-03-20 PROCEDURE — 97530 THERAPEUTIC ACTIVITIES: CPT

## 2018-03-20 PROCEDURE — 97116 GAIT TRAINING THERAPY: CPT

## 2018-03-20 PROCEDURE — 80048 BASIC METABOLIC PNL TOTAL CA: CPT

## 2018-03-20 PROCEDURE — 99900035 HC TECH TIME PER 15 MIN (STAT)

## 2018-03-20 PROCEDURE — 94760 N-INVAS EAR/PLS OXIMETRY 1: CPT

## 2018-03-20 PROCEDURE — 96372 THER/PROPH/DIAG INJ SC/IM: CPT

## 2018-03-20 PROCEDURE — S0028 INJECTION, FAMOTIDINE, 20 MG: HCPCS | Performed by: SURGERY

## 2018-03-20 PROCEDURE — 36415 COLL VENOUS BLD VENIPUNCTURE: CPT

## 2018-03-20 PROCEDURE — 63600175 PHARM REV CODE 636 W HCPCS: Performed by: NURSE PRACTITIONER

## 2018-03-20 PROCEDURE — 94770 HC EXHALED C02 TEST: CPT

## 2018-03-20 PROCEDURE — 25000003 PHARM REV CODE 250: Performed by: SURGERY

## 2018-03-20 PROCEDURE — 84100 ASSAY OF PHOSPHORUS: CPT

## 2018-03-20 PROCEDURE — 94799 UNLISTED PULMONARY SVC/PX: CPT

## 2018-03-20 PROCEDURE — S0030 INJECTION, METRONIDAZOLE: HCPCS | Performed by: SURGERY

## 2018-03-20 PROCEDURE — 21400001 HC TELEMETRY ROOM

## 2018-03-20 PROCEDURE — 83735 ASSAY OF MAGNESIUM: CPT

## 2018-03-20 PROCEDURE — 63600175 PHARM REV CODE 636 W HCPCS: Performed by: SURGERY

## 2018-03-20 PROCEDURE — 27000221 HC OXYGEN, UP TO 24 HOURS

## 2018-03-20 PROCEDURE — 85025 COMPLETE CBC W/AUTO DIFF WBC: CPT

## 2018-03-20 PROCEDURE — 25000003 PHARM REV CODE 250: Performed by: NURSE PRACTITIONER

## 2018-03-20 RX ORDER — DEXTROSE MONOHYDRATE AND SODIUM CHLORIDE 5; .9 G/100ML; G/100ML
INJECTION, SOLUTION INTRAVENOUS CONTINUOUS
Status: DISCONTINUED | OUTPATIENT
Start: 2018-03-20 | End: 2018-03-22 | Stop reason: HOSPADM

## 2018-03-20 RX ADMIN — SODIUM CHLORIDE, POTASSIUM CHLORIDE, SODIUM LACTATE AND CALCIUM CHLORIDE 1000 ML: 600; 310; 30; 20 INJECTION, SOLUTION INTRAVENOUS at 01:03

## 2018-03-20 RX ADMIN — METRONIDAZOLE 500 MG: 500 INJECTION, SOLUTION INTRAVENOUS at 04:03

## 2018-03-20 RX ADMIN — FAMOTIDINE 20 MG: 20 INJECTION, SOLUTION INTRAVENOUS at 08:03

## 2018-03-20 RX ADMIN — POTASSIUM CHLORIDE 10 MEQ: 149 INJECTION, SOLUTION, CONCENTRATE INTRAVENOUS at 12:03

## 2018-03-20 RX ADMIN — DEXTROSE AND SODIUM CHLORIDE: 5; .9 INJECTION, SOLUTION INTRAVENOUS at 10:03

## 2018-03-20 RX ADMIN — CEFTRIAXONE SODIUM 2 G: 2 INJECTION, POWDER, FOR SOLUTION INTRAMUSCULAR; INTRAVENOUS at 06:03

## 2018-03-20 RX ADMIN — METRONIDAZOLE 500 MG: 500 INJECTION, SOLUTION INTRAVENOUS at 01:03

## 2018-03-20 RX ADMIN — Medication: at 06:03

## 2018-03-20 RX ADMIN — DEXTROSE MONOHYDRATE 12.5 G: 500 INJECTION PARENTERAL at 07:03

## 2018-03-20 RX ADMIN — CHLORHEXIDINE GLUCONATE 10 ML: 1.2 RINSE ORAL at 08:03

## 2018-03-20 RX ADMIN — METRONIDAZOLE 500 MG: 500 INJECTION, SOLUTION INTRAVENOUS at 08:03

## 2018-03-20 RX ADMIN — DEXTROSE AND SODIUM CHLORIDE: 5; .9 INJECTION, SOLUTION INTRAVENOUS at 09:03

## 2018-03-20 RX ADMIN — ONDANSETRON 4 MG: 2 INJECTION INTRAMUSCULAR; INTRAVENOUS at 07:03

## 2018-03-20 RX ADMIN — CEFTRIAXONE SODIUM 2 G: 2 INJECTION, POWDER, FOR SOLUTION INTRAMUSCULAR; INTRAVENOUS at 04:03

## 2018-03-20 RX ADMIN — ENOXAPARIN SODIUM 40 MG: 100 INJECTION SUBCUTANEOUS at 08:03

## 2018-03-20 NOTE — PLAN OF CARE
Problem: Patient Care Overview  Goal: Plan of Care Review  Outcome: Ongoing (interventions implemented as appropriate)  IVF and IV abx given per MD orders.PCA in use; Blood glucose monitored;Pt NPO with ice chips;Ambulate to bathroom;No s/s of acute distress. 12hr chart check complete.

## 2018-03-20 NOTE — PROGRESS NOTES
[facility] Hospital Medicine  Progress Note    [facility]  [facility]: [id number]  Patient Class: IP- Inpatient   Admission Date: 3/18/2018  Length of Stay: 2 days  Attending Physician: Thom Hernandez MD  Primary Care Provider: Tom Love Jr, MD        Subjective:     Principal Problem:Small bowel perforation    HPI:  57F h/o GERD and nephrolithiasis presents with lower abdominal pain which began after eating pizza yesterday.  Associated with n/v and decreased oral intake.   Denies fevers, chills, constipation and dysuria.  Reports one episode of watery stool yesterday.  None since then.  No bloody bowel movements.  No other alleviating or exacerbating factors.   She denies family history of IBD.  Does report her mother has history of diverticulosis and fistula.   In Er, CT abdomen show Small thick walled fluid collection measuring 2.1 x 3.2 x 2.9 cm in the right hemipelvis consistent with an abscess.  Adjacent mildly dilated thick walled loop of bowel containing enteric contrast with 2 abnormal communications to the adjacent ileum.    Patient was taken to surgery (dr. Hernandez) for ex lap for washout and possible small bowel resection.  Hospital medicine called for consult on medical management.     Hospital Course:  The pt was admitted with pelvic abscess-concerning for SBO with perforation on IV Rocephin and Flagyl. Care discussed with General surgery. Pt underwent Ex Lap and Small bowel resection due to inflamed meckel's diverticulum. Pt tolerated surgery well. No complaints except incisional pain - controlled with PCA. +flatus. Had some N/V this am. Ambulating. No other complaints or issues.   Mild hypoglycemia- switched to D5NS        Review of Systems   Constitutional: Positive for fatigue. Negative for appetite change, chills, diaphoresis, fever and unexpected weight change.   HENT: Negative for congestion, nosebleeds, sinus pressure and sore throat.    Eyes:  Negative for pain, discharge and visual disturbance.   Respiratory: Negative for cough, chest tightness, shortness of breath, wheezing and stridor.    Cardiovascular: Negative for chest pain, palpitations and leg swelling.   Gastrointestinal: Positive for abdominal pain. Negative for abdominal distention, blood in stool, constipation, diarrhea, nausea and vomiting.   Endocrine: Negative for cold intolerance and heat intolerance.   Genitourinary: Negative for difficulty urinating, dysuria, flank pain, frequency and urgency.   Musculoskeletal: Negative for arthralgias, back pain, joint swelling, myalgias, neck pain and neck stiffness.   Skin: Negative for rash and wound.   Allergic/Immunologic: Negative for food allergies and immunocompromised state.   Neurological: Negative for dizziness, seizures, syncope, facial asymmetry, speech difficulty, weakness, light-headedness, numbness and headaches.   Hematological: Negative for adenopathy.   Psychiatric/Behavioral: Negative for agitation, confusion and hallucinations.     Objective:     Vital Signs (Most Recent):  Temp: 98.2 °F (36.8 °C) (03/20/18 1222)  Pulse: 62 (03/20/18 1222)  Resp: 18 (03/20/18 1222)  BP: 119/62 (03/20/18 1222)  SpO2: 100 % (03/20/18 1222) Vital Signs (24h Range):  Temp:  [97.5 °F (36.4 °C)-98.9 °F (37.2 °C)] 98.2 °F (36.8 °C)  Pulse:  [61-73] 62  Resp:  [12-20] 18  SpO2:  [95 %-100 %] 100 %  BP: ()/(51-64) 119/62     Weight: 63.5 kg (140 lb)  Body mass index is 25.61 kg/m².    Physical Exam   Constitutional: She is oriented to person, place, and time. She appears well-developed and well-nourished. No distress.   HENT:   Head: Normocephalic and atraumatic.   Nose: Nose normal.   Mouth/Throat: Oropharynx is clear and moist.   Eyes: Conjunctivae are normal. No scleral icterus.   Neck: Normal range of motion. Neck supple. No tracheal deviation present.   Cardiovascular: Normal rate, regular rhythm, normal heart sounds and intact distal pulses.   Exam reveals no gallop and no friction rub.    No murmur heard.  Pulmonary/Chest: Effort normal and breath sounds normal. No stridor. No respiratory distress. She has no wheezes. She has no rales. She exhibits no tenderness.   Abdominal: Soft. Bowel sounds are normal. She exhibits no distension and no mass. There is tenderness (Incisional ). There is no rebound and no guarding.   Musculoskeletal: Normal range of motion. She exhibits no edema, tenderness or deformity.   Neurological: She is alert and oriented to person, place, and time. No cranial nerve deficit. She exhibits normal muscle tone. Coordination normal.   Skin: Skin is warm and dry. No rash noted. She is not diaphoretic. No erythema. No pallor.   Psychiatric: She has a normal mood and affect. Her behavior is normal. Thought content normal.           Significant Labs:   BMP:     Recent Labs  Lab 03/20/18 0518   GLU 68*      K 3.6      CO2 22*   BUN 6   CREATININE 0.6   CALCIUM 8.1*   MG 1.6     CBC:     Recent Labs  Lab 03/19/18 0852 03/20/18 0518   WBC 11.21 7.69   HGB 12.4 11.3*   HCT 37.1 33.0*   * 122*     CMP:     Recent Labs  Lab 03/19/18 0852 03/20/18 0518    137   K 3.4* 3.6    109   CO2 20* 22*   GLU 78 68*   BUN 8 6   CREATININE 0.6 0.6   CALCIUM 8.3* 8.1*   ANIONGAP 9 6*   EGFRNONAA >60 >60     All pertinent labs within the past 24 hours have been reviewed.    Significant Imaging:  Imaging Results          CT Abdomen Pelvis With Contrast (Final result)  Result time 03/18/18 15:14:22    Final result by Stuart Siegel MD (03/18/18 15:14:22)                 Impression:           Small thick walled fluid collection measuring 2.1 x 3.2 x 2.9 cm in the right hemipelvis consistent with an abscess.  Adjacent mildly dilated thick walled loop of bowel containing enteric contrast with 2 abnormal communications to the adjacent ileum.  Differential considerations include focal contained perforation/fistula formation  secondary to inflammatory bowel disease or surgery.      All CT scans at this facility use dose modulation, iterative reconstruction and/or weight based dosing when appropriate to reduce radiation dose to as low as reasonably achievable.   Electronically signed by: DEVIN SIEGEL MD  Date:     03/18/18  Time:    15:14              Narrative:    Exam: CT ABDOMEN PELVIS WITH CONTRAST  Technique: Axial CT imaging was performed through the abdomen and pelvis with  75cc  of intravenous contrast. Multiplanar reformats were performed and interpreted.  Enteric contrast was utilized.  Clinical History:  Abdominal pain.  Infection, abdomen-pelvis right pelvis abscess ? bowel perforation     Comparison: CT abdomen and pelvis and pelvic ultrasound performed earlier today.  Findings:       There is a thickwalled fluid collection containing a bubble of air again demonstrated in the right hemipelvis dense abuts a mildly distended loop, thick walled loop of small bowel containing enteric contrast that has an abnormal configuration and communication with the adjacent small bowel loops.  The collection measures 2.1 x 3.2 x 2.9 cm most consistent with an abscess.  The measurements of the collection on the previous CT exam included the dilated loop of small bowel which is contiguous with the abscess.  The mildly dilated thick walled bowel mentioned above measures approximately 5 cm in length and appears to have 2 fistulous communications with the adjacent normal ileum.  Adjacent surgical clips in the right hemipelvis.  The terminal ileum is within normal limits.                             US Pelvis Complete Non OB (Final result)  Result time 03/18/18 12:52:59    Final result by Devin Siegel MD (03/18/18 12:52:59)                 Impression:     Bowel gas limits evaluation.  The thickwalled fluid collection in the right hemipelvis was identified.  Electronically signed by: DEVIN SIEGEL MD  Date:     03/18/18  Time:    12:52               Narrative:    Exam: US PELVIS COMPLETE NON OB  Clinical History:    Abdominal pain.  Abnormal CT examination.  Findings:     The uterus is normal in size and echotexture with a normal 3 mm endometrial stripe.  1.3 cm intramural posterior uterine wall fibroid.  The right ovary and adnexa was visualized secondary to bowel gas.  Fluid-filled structure with peristalsis adjacent to the left ovary appears to represent a small bowel loop.  The left ovary measures 2.4 x 2.4 x 2.8 cm.  No free pelvic fluid.                             CT Renal Stone Study ABD Pelvis WO (Final result)  Result time 03/18/18 11:26:37    Final result by Devin Siegel MD (03/18/18 11:26:37)                 Impression:           Thickwalled complex fluid collection in the right hemipelvis containing air measuring 4.0 x 5.5 cm suspicious for an abscess.  Differential considerations include a tubo-ovarian abscess or focal small bowel perforation with abscess.  Recommend a repeat study with enteric contrast following at least a 90 minute delay.  Also, an ultrasound may be helpful to evaluate the fluid collections relationship with the right ovary.      All CT scans at this facility use dose modulation, iterative reconstruction and/or weight based dosing when appropriate to reduce radiation dose to as low as reasonably achievable.   Electronically signed by: DEVIN SIEGEL MD  Date:     03/18/18  Time:    11:26              Narrative:    Exam: CT RENAL STONE STUDY ABD PELVIS WO  Technique: Axial CT images performed through the abdomen and pelvis without intravenous contrast. Multiplanar reformats were performed and interpreted.  Comparison: CT abdomen on October 9, 2016  Clinical History: Abdominal pain. Flank pain, stone disease suspected Hematuria      Findings:       Lung bases are clear.  No urolithiasis, hydronephrosis, or perinephric stranding.  Mildly complex right interpolar renal cyst measuring up to 7 cm.  No hydronephrosis or  urolithiasis.  The liver, spleen, adrenal glands, and pancreas have a normal noncontrasted appearance.   The gallbladder is unremarkable.  No free fluid, free air.  The bowel is nondistended and within normal limits.  The appendix is normal.  In the right hemipelvis, there is a thick walled complex fluid collection measuring 4.0 x 5.5 cm containing bubbles of air that is contiguous with the right ovary.  A loop of small bowel is contiguous with the fluid collection.  The fluid collection also abuts the fundus of the uterus.  The abdominal aorta is normal in caliber.  The urinary bladder is unremarkable.     No significant osseous abnormality is identified.                            Assessment/Plan:      * Small bowel perforation with abscess due to Meckel's diverticulum s/p small bowel resection     Cont PCA for pain control  Supportive care  PT/OT  Cont IV Rocephin and Flagyl   IS          Hypoglycemia    Mild  Switch IVfs to D5NS          Hypokalemia    Replete          GERD (gastroesophageal reflux disease)    Continue PPI          Meckel's diverticulum    See above             VTE Risk Mitigation         Ordered     enoxaparin injection 40 mg  Daily     Route:  Subcutaneous        03/18/18 2025     IP VTE LOW RISK PATIENT  Once      03/18/18 2025              Kathi Garcia NP  Department of Hospital Medicine   [facility]

## 2018-03-20 NOTE — SUBJECTIVE & OBJECTIVE
Review of Systems   Constitutional: Positive for fatigue. Negative for appetite change, chills, diaphoresis, fever and unexpected weight change.   HENT: Negative for congestion, nosebleeds, sinus pressure and sore throat.    Eyes: Negative for pain, discharge and visual disturbance.   Respiratory: Negative for cough, chest tightness, shortness of breath, wheezing and stridor.    Cardiovascular: Negative for chest pain, palpitations and leg swelling.   Gastrointestinal: Positive for abdominal pain. Negative for abdominal distention, blood in stool, constipation, diarrhea, nausea and vomiting.   Endocrine: Negative for cold intolerance and heat intolerance.   Genitourinary: Negative for difficulty urinating, dysuria, flank pain, frequency and urgency.   Musculoskeletal: Negative for arthralgias, back pain, joint swelling, myalgias, neck pain and neck stiffness.   Skin: Negative for rash and wound.   Allergic/Immunologic: Negative for food allergies and immunocompromised state.   Neurological: Negative for dizziness, seizures, syncope, facial asymmetry, speech difficulty, weakness, light-headedness, numbness and headaches.   Hematological: Negative for adenopathy.   Psychiatric/Behavioral: Negative for agitation, confusion and hallucinations.     Objective:     Vital Signs (Most Recent):  Temp: 98.2 °F (36.8 °C) (03/20/18 1222)  Pulse: 62 (03/20/18 1222)  Resp: 18 (03/20/18 1222)  BP: 119/62 (03/20/18 1222)  SpO2: 100 % (03/20/18 1222) Vital Signs (24h Range):  Temp:  [97.5 °F (36.4 °C)-98.9 °F (37.2 °C)] 98.2 °F (36.8 °C)  Pulse:  [61-73] 62  Resp:  [12-20] 18  SpO2:  [95 %-100 %] 100 %  BP: ()/(51-64) 119/62     Weight: 63.5 kg (140 lb)  Body mass index is 25.61 kg/m².    Physical Exam   Constitutional: She is oriented to person, place, and time. She appears well-developed and well-nourished. No distress.   HENT:   Head: Normocephalic and atraumatic.   Nose: Nose normal.   Mouth/Throat: Oropharynx is clear  and moist.   Eyes: Conjunctivae are normal. No scleral icterus.   Neck: Normal range of motion. Neck supple. No tracheal deviation present.   Cardiovascular: Normal rate, regular rhythm, normal heart sounds and intact distal pulses.  Exam reveals no gallop and no friction rub.    No murmur heard.  Pulmonary/Chest: Effort normal and breath sounds normal. No stridor. No respiratory distress. She has no wheezes. She has no rales. She exhibits no tenderness.   Abdominal: Soft. Bowel sounds are normal. She exhibits no distension and no mass. There is tenderness (Incisional ). There is no rebound and no guarding.   Musculoskeletal: Normal range of motion. She exhibits no edema, tenderness or deformity.   Neurological: She is alert and oriented to person, place, and time. No cranial nerve deficit. She exhibits normal muscle tone. Coordination normal.   Skin: Skin is warm and dry. No rash noted. She is not diaphoretic. No erythema. No pallor.   Psychiatric: She has a normal mood and affect. Her behavior is normal. Thought content normal.           Significant Labs:   BMP:     Recent Labs  Lab 03/20/18 0518   GLU 68*      K 3.6      CO2 22*   BUN 6   CREATININE 0.6   CALCIUM 8.1*   MG 1.6     CBC:     Recent Labs  Lab 03/19/18  0852 03/20/18  0518   WBC 11.21 7.69   HGB 12.4 11.3*   HCT 37.1 33.0*   * 122*     CMP:     Recent Labs  Lab 03/19/18  0852 03/20/18  0518    137   K 3.4* 3.6    109   CO2 20* 22*   GLU 78 68*   BUN 8 6   CREATININE 0.6 0.6   CALCIUM 8.3* 8.1*   ANIONGAP 9 6*   EGFRNONAA >60 >60     All pertinent labs within the past 24 hours have been reviewed.    Significant Imaging:  Imaging Results          CT Abdomen Pelvis With Contrast (Final result)  Result time 03/18/18 15:14:22    Final result by Stuart Siegel MD (03/18/18 15:14:22)                 Impression:           Small thick walled fluid collection measuring 2.1 x 3.2 x 2.9 cm in the right hemipelvis consistent with an  abscess.  Adjacent mildly dilated thick walled loop of bowel containing enteric contrast with 2 abnormal communications to the adjacent ileum.  Differential considerations include focal contained perforation/fistula formation secondary to inflammatory bowel disease or surgery.      All CT scans at this facility use dose modulation, iterative reconstruction and/or weight based dosing when appropriate to reduce radiation dose to as low as reasonably achievable.   Electronically signed by: DEVIN SIEGEL MD  Date:     03/18/18  Time:    15:14              Narrative:    Exam: CT ABDOMEN PELVIS WITH CONTRAST  Technique: Axial CT imaging was performed through the abdomen and pelvis with  75cc  of intravenous contrast. Multiplanar reformats were performed and interpreted.  Enteric contrast was utilized.  Clinical History:  Abdominal pain.  Infection, abdomen-pelvis right pelvis abscess ? bowel perforation     Comparison: CT abdomen and pelvis and pelvic ultrasound performed earlier today.  Findings:       There is a thickwalled fluid collection containing a bubble of air again demonstrated in the right hemipelvis dense abuts a mildly distended loop, thick walled loop of small bowel containing enteric contrast that has an abnormal configuration and communication with the adjacent small bowel loops.  The collection measures 2.1 x 3.2 x 2.9 cm most consistent with an abscess.  The measurements of the collection on the previous CT exam included the dilated loop of small bowel which is contiguous with the abscess.  The mildly dilated thick walled bowel mentioned above measures approximately 5 cm in length and appears to have 2 fistulous communications with the adjacent normal ileum.  Adjacent surgical clips in the right hemipelvis.  The terminal ileum is within normal limits.                             US Pelvis Complete Non OB (Final result)  Result time 03/18/18 12:52:59    Final result by Devin Siegel MD (03/18/18 12:52:59)                  Impression:     Bowel gas limits evaluation.  The thickwalled fluid collection in the right hemipelvis was identified.  Electronically signed by: DEVIN SIEGEL MD  Date:     03/18/18  Time:    12:52              Narrative:    Exam: US PELVIS COMPLETE NON OB  Clinical History:    Abdominal pain.  Abnormal CT examination.  Findings:     The uterus is normal in size and echotexture with a normal 3 mm endometrial stripe.  1.3 cm intramural posterior uterine wall fibroid.  The right ovary and adnexa was visualized secondary to bowel gas.  Fluid-filled structure with peristalsis adjacent to the left ovary appears to represent a small bowel loop.  The left ovary measures 2.4 x 2.4 x 2.8 cm.  No free pelvic fluid.                             CT Renal Stone Study ABD Pelvis WO (Final result)  Result time 03/18/18 11:26:37    Final result by Devin Siegel MD (03/18/18 11:26:37)                 Impression:           Thickwalled complex fluid collection in the right hemipelvis containing air measuring 4.0 x 5.5 cm suspicious for an abscess.  Differential considerations include a tubo-ovarian abscess or focal small bowel perforation with abscess.  Recommend a repeat study with enteric contrast following at least a 90 minute delay.  Also, an ultrasound may be helpful to evaluate the fluid collections relationship with the right ovary.      All CT scans at this facility use dose modulation, iterative reconstruction and/or weight based dosing when appropriate to reduce radiation dose to as low as reasonably achievable.   Electronically signed by: DEVIN SIEGEL MD  Date:     03/18/18  Time:    11:26              Narrative:    Exam: CT RENAL STONE STUDY ABD PELVIS WO  Technique: Axial CT images performed through the abdomen and pelvis without intravenous contrast. Multiplanar reformats were performed and interpreted.  Comparison: CT abdomen on October 9, 2016  Clinical History: Abdominal pain. Flank pain, stone disease  suspected Hematuria      Findings:       Lung bases are clear.  No urolithiasis, hydronephrosis, or perinephric stranding.  Mildly complex right interpolar renal cyst measuring up to 7 cm.  No hydronephrosis or urolithiasis.  The liver, spleen, adrenal glands, and pancreas have a normal noncontrasted appearance.   The gallbladder is unremarkable.  No free fluid, free air.  The bowel is nondistended and within normal limits.  The appendix is normal.  In the right hemipelvis, there is a thick walled complex fluid collection measuring 4.0 x 5.5 cm containing bubbles of air that is contiguous with the right ovary.  A loop of small bowel is contiguous with the fluid collection.  The fluid collection also abuts the fundus of the uterus.  The abdominal aorta is normal in caliber.  The urinary bladder is unremarkable.     No significant osseous abnormality is identified.

## 2018-03-20 NOTE — ASSESSMENT & PLAN NOTE
S/p laparotomy with resection meckels diverticulum.   Continue PCA for pain control  Incentive spirometer   ambulate  Continue antibiotics  Sips of clear liquids

## 2018-03-20 NOTE — PLAN OF CARE
Problem: Patient Care Overview  Goal: Plan of Care Review  Outcome: Ongoing (interventions implemented as appropriate)  Pt is tolerating NC well. EtCO2 monitoring in progress to continue for duration of PCA pain medication therapy.IS proper procedure, importance of use, and frequency explained to patient.  Performed with good effort.  Pt board in room updated with RT POC.

## 2018-03-20 NOTE — PROGRESS NOTES
[facility] -   General Surgery  Progress Note    Subjective:     History of Present Illness:  57-year-old female referred for pelvic abscess with fistulous connection to small bowel concerning for perforation.  The patient presented with lower abdominal pain which she said began after eating pizza yesterday.  She she reports nausea, vomiting, diarrhea that began after eating pizza and her lower abdominal pain worsen.  Denies any fever/chills, dysuria or any other complaints.    Post-Op Info:  Procedure(s) (LRB):  EXPLORATORY-LAPAROTOMY (N/A)  RESECTION-SMALL BOWEL MECKEL'S DIVERTICULUM (N/A)   2 Days Post-Op     Interval History: one episode of nausea/emesis today. Passing gas. Pain controlled.     Medications:  Continuous Infusions:   dextrose 5 % and 0.9 % NaCl      hydromorphone in 0.9 % NaCl 6 mg/30 ml       Scheduled Meds:   cefTRIAXone (ROCEPHIN) IVPB  2 g Intravenous Q12H    chlorhexidine  10 mL Mouth/Throat BID    enoxaparin  40 mg Subcutaneous Daily    famotidine  20 mg Intravenous Q12H    metronidazole  500 mg Intravenous Q8H    nozaseptin   Each Nare BID     PRN Meds:acetaminophen, cloNIDine, dextrose 50%, diphenhydrAMINE, glucagon (human recombinant), insulin aspart U-100, naloxone, ondansetron, promethazine, ramelteon, sodium chloride 0.9%     Review of patient's allergies indicates:   Allergen Reactions    Nexium [esomeprazole magnesium] Other (See Comments)     Headache     Objective:     Vital Signs (Most Recent):  Temp: 98.2 °F (36.8 °C) (03/20/18 0732)  Pulse: 69 (03/20/18 0732)  Resp: 20 (03/20/18 0732)  BP: 109/64 (03/20/18 0732)  SpO2: 96 % (03/20/18 0732) Vital Signs (24h Range):  Temp:  [97.5 °F (36.4 °C)-98.9 °F (37.2 °C)] 98.2 °F (36.8 °C)  Pulse:  [61-73] 69  Resp:  [12-20] 20  SpO2:  [95 %-98 %] 96 %  BP: ()/(51-64) 109/64     Weight: 63.5 kg (140 lb)  Body mass index is 25.61 kg/m².    Intake/Output - Last 3 Shifts       03/18 0700 - 03/19 0659 03/19 0700 -  03/20 0659 03/20 0700 - 03/21 0659    P.O. 0 0     I.V. (mL/kg) 1400 (22) 3666.5 (57.7)     IV Piggyback 300 450     Total Intake(mL/kg) 1700 (26.8) 4116.5 (64.8)     Urine (mL/kg/hr) 1250 1350 (0.9)     Emesis/NG output 250      Blood 30      Total Output 1530 1350      Net +170 +2766.5                   Physical Exam   Constitutional: She is oriented to person, place, and time. She appears well-developed and well-nourished.   HENT:   Head: Normocephalic and atraumatic.   Eyes: EOM are normal.   Cardiovascular: Normal rate and regular rhythm.    Pulmonary/Chest: Effort normal. No respiratory distress.   Abdominal: Soft. Bowel sounds are normal. She exhibits no distension. There is tenderness (mild incisional).   Musculoskeletal: Normal range of motion.   Neurological: She is alert and oriented to person, place, and time.   Skin: Skin is warm and dry.   Psychiatric: She has a normal mood and affect. Thought content normal.   Vitals reviewed.      Significant Labs:  CBC:   Recent Labs  Lab 03/20/18  0518   WBC 7.69   RBC 3.60*   HGB 11.3*   HCT 33.0*   *   MCV 92   MCH 31.4*   MCHC 34.2     CMP:   Recent Labs  Lab 03/18/18  0930  03/20/18  0518   GLU 96  < > 68*   CALCIUM 9.1  < > 8.1*   ALBUMIN 3.7  --   --    PROT 6.8  --   --      < > 137   K 3.8  < > 3.6   CO2 22*  < > 22*     < > 109   BUN 12  < > 6   CREATININE 0.8  < > 0.6   ALKPHOS 75  --   --    ALT 12  --   --    AST 20  --   --    BILITOT 0.8  --   --    < > = values in this interval not displayed.    Significant Diagnostics:  I have reviewed all pertinent imaging results/findings within the past 24 hours.    Assessment/Plan:     * Small bowel perforation with abscess due to Meckel's diverticulum s/p small bowel resection     S/p laparotomy with resection meckels diverticulum.   Continue PCA for pain control  Incentive spirometer   ambulate  Continue antibiotics  Check XR today and possibly start sips of clear liquids if normal           Meckel's diverticulum    S/p laparotomy with resection meckels diverticulum.   Continue PCA for pain control  Incentive spirometer  Out of bed, PT eval and treat  Continue antibiotics            Manjula Dover PA-C  General Surgery  [facility] [place]

## 2018-03-20 NOTE — PLAN OF CARE
Problem: Patient Care Overview  Goal: Plan of Care Review  Outcome: Ongoing (interventions implemented as appropriate)  Fall precautions maintained, patient remains free from injury. Pain being managed with PCA pump. Medications and fluids being administered as ordered. Patient complaining of intermittent nausea, being controlled with IV Zofran. Dressing to abdomen has marked dried drainage that has not spread beyond marked area. Bed low and locked with call light in reach. 12 hour chart check complete. Will continue to monitor.

## 2018-03-20 NOTE — PT/OT/SLP PROGRESS
Physical Therapy  Treatment    Larisa Rosadofield   MRN: [id number]   Admitting Diagnosis: Small bowel perforation    PT Received On: 03/20/18  PT Start Time: 0920     PT Stop Time: 0943    PT Total Time (min): 23 min       Billable Minutes:  Gait Training 13 and Therapeutic Activity 10    Treatment Type: Treatment  PT/PTA: PT             General Precautions: Standard,    Orthopedic Precautions: N/A   Braces: N/A         Subjective:  Communicated with NURSE HENDRICKSON AND EPIC CHART REVIEW  prior to session.   PT AGREED TO TX     Pain/Comfort  Pain Rating 1: 7/10  Location 1: abdomen  Pain Rating Post-Intervention 1: 7/10    Objective:   Patient found with: oxygen, peripheral IV, PCA    Functional Mobility:  PT SUP>SIT EOB WITH SBA AND CUES FOR TECHNIQUE. PT STOOD WITH RW AND SBA FOR GT X 250' WITH SLOW PACE AND SBA. PT RETURNED TO RM TO BATHROOM FOR TOILETING WITH SBA. PT TO RM T/F TO CHAIR WITH RW AND SBA. PT LEFT SEATED IN CHAIR WITH ALL NEEDS MET.     AM-PAC 6 CLICK MOBILITY  How much help from another person does this patient currently need?   1 = Unable, Total/Dependent Assistance  2 = A lot, Maximum/Moderate Assistance  3 = A little, Minimum/Contact Guard/Supervision  4 = None, Modified [place]/Independent    Turning over in bed (including adjusting bedclothes, sheets and blankets)?: 3  Sitting down on and standing up from a chair with arms (e.g., wheelchair, bedside commode, etc.): 4  Moving from lying on back to sitting on the side of the bed?: 3  Moving to and from a bed to a chair (including a wheelchair)?: 4  Need to walk in hospital room?: 4  Climbing 3-5 steps with a railing?: 1  Total Score: 19    AM-PAC Raw Score CMS G-Code Modifier Level of Impairment Assistance   6 % Total / Unable   7 - 9 CM 80 - 100% Maximal Assist   10 - 14 CL 60 - 80% Moderate Assist   15 - 19 CK 40 - 60% Moderate Assist   20 - 22 CJ 20 - 40% Minimal Assist   23 CI 1-20% SBA / CGA   24 CH 0% Independent/ Mod I      Patient left up in chair with call button in reach.    Assessment:  PT PROGRESSING WITH GT AND GROSS FUNC MOBILITY     Rehab identified problem list/impairments: Rehab identified problem list/impairments: weakness, impaired functional mobilty, impaired endurance, gait instability, pain, impaired balance    Rehab potential is excellent.    Activity tolerance: Good    Discharge recommendations: Discharge Facility/Level Of Care Needs: home health PT     Barriers to discharge:      Equipment recommendations: Equipment Needed After Discharge: none     GOALS:    Physical Therapy Goals        Problem: Physical Therapy Goal    Goal Priority Disciplines Outcome Goal Variances Interventions   Physical Therapy Goal     PT/OT, PT Ongoing (interventions implemented as appropriate)     Description:  In one week pt will be able to:  1 sit to/from supine with giuseppe  2. T/f with SPV  3. Gait w or w/o  ft with SPV                    PLAN:    Patient to be seen 5 x/week  to address the above listed problems via gait training, therapeutic activities, therapeutic exercises  Plan of Care expires: 03/26/18  Plan of Care reviewed with: patient         Shefali Russel, PT  03/20/2018

## 2018-03-20 NOTE — PLAN OF CARE
Problem: Physical Therapy Goal  Goal: Physical Therapy Goal  In one week pt will be able to:  1 sit to/from supine with giuseppe  2. T/f with SPV  3. Gait w or w/o  ft with SPV   Outcome: Ongoing (interventions implemented as appropriate)  PT GT TRAINED X 250' WITH RW AND SBA.

## 2018-03-20 NOTE — SUBJECTIVE & OBJECTIVE
Interval History: one episode of nausea/emesis today. Passing gas. Pain controlled.     Medications:  Continuous Infusions:   dextrose 5 % and 0.9 % NaCl      hydromorphone in 0.9 % NaCl 6 mg/30 ml       Scheduled Meds:   cefTRIAXone (ROCEPHIN) IVPB  2 g Intravenous Q12H    chlorhexidine  10 mL Mouth/Throat BID    enoxaparin  40 mg Subcutaneous Daily    famotidine  20 mg Intravenous Q12H    metronidazole  500 mg Intravenous Q8H    nozaseptin   Each Nare BID     PRN Meds:acetaminophen, cloNIDine, dextrose 50%, diphenhydrAMINE, glucagon (human recombinant), insulin aspart U-100, naloxone, ondansetron, promethazine, ramelteon, sodium chloride 0.9%     Review of patient's allergies indicates:   Allergen Reactions    Nexium [esomeprazole magnesium] Other (See Comments)     Headache     Objective:     Vital Signs (Most Recent):  Temp: 98.2 °F (36.8 °C) (03/20/18 0732)  Pulse: 69 (03/20/18 0732)  Resp: 20 (03/20/18 0732)  BP: 109/64 (03/20/18 0732)  SpO2: 96 % (03/20/18 0732) Vital Signs (24h Range):  Temp:  [97.5 °F (36.4 °C)-98.9 °F (37.2 °C)] 98.2 °F (36.8 °C)  Pulse:  [61-73] 69  Resp:  [12-20] 20  SpO2:  [95 %-98 %] 96 %  BP: ()/(51-64) 109/64     Weight: 63.5 kg (140 lb)  Body mass index is 25.61 kg/m².    Intake/Output - Last 3 Shifts       03/18 0700 - 03/19 0659 03/19 0700 - 03/20 0659 03/20 0700 - 03/21 0659    P.O. 0 0     I.V. (mL/kg) 1400 (22) 3666.5 (57.7)     IV Piggyback 300 450     Total Intake(mL/kg) 1700 (26.8) 4116.5 (64.8)     Urine (mL/kg/hr) 1250 1350 (0.9)     Emesis/NG output 250      Blood 30      Total Output 1530 1350      Net +170 +2766.5                   Physical Exam   Constitutional: She is oriented to person, place, and time. She appears well-developed and well-nourished.   HENT:   Head: Normocephalic and atraumatic.   Eyes: EOM are normal.   Cardiovascular: Normal rate and regular rhythm.    Pulmonary/Chest: Effort normal. No respiratory distress.   Abdominal: Soft. Bowel  sounds are normal. She exhibits no distension. There is tenderness (mild incisional).   Musculoskeletal: Normal range of motion.   Neurological: She is alert and oriented to person, place, and time.   Skin: Skin is warm and dry.   Psychiatric: She has a normal mood and affect. Thought content normal.   Vitals reviewed.      Significant Labs:  CBC:   Recent Labs  Lab 03/20/18  0518   WBC 7.69   RBC 3.60*   HGB 11.3*   HCT 33.0*   *   MCV 92   MCH 31.4*   MCHC 34.2     CMP:   Recent Labs  Lab 03/18/18  0930  03/20/18  0518   GLU 96  < > 68*   CALCIUM 9.1  < > 8.1*   ALBUMIN 3.7  --   --    PROT 6.8  --   --      < > 137   K 3.8  < > 3.6   CO2 22*  < > 22*     < > 109   BUN 12  < > 6   CREATININE 0.8  < > 0.6   ALKPHOS 75  --   --    ALT 12  --   --    AST 20  --   --    BILITOT 0.8  --   --    < > = values in this interval not displayed.    Significant Diagnostics:  I have reviewed all pertinent imaging results/findings within the past 24 hours.

## 2018-03-21 PROBLEM — G43.909 MIGRAINE: Status: ACTIVE | Noted: 2018-03-18

## 2018-03-21 PROBLEM — G43.909 MIGRAINE: Status: RESOLVED | Noted: 2018-03-18 | Resolved: 2018-03-19

## 2018-03-21 PROBLEM — E16.2 HYPOGLYCEMIA: Status: RESOLVED | Noted: 2018-03-20 | Resolved: 2018-03-21

## 2018-03-21 LAB
ANCA AB TITR SER IF: NORMAL TITER
P-ANCA TITR SER IF: NORMAL TITER
POCT GLUCOSE: 111 MG/DL (ref 70–110)
POCT GLUCOSE: 121 MG/DL (ref 70–110)
POCT GLUCOSE: 124 MG/DL (ref 70–110)

## 2018-03-21 PROCEDURE — 25000003 PHARM REV CODE 250: Performed by: SURGERY

## 2018-03-21 PROCEDURE — 21400001 HC TELEMETRY ROOM

## 2018-03-21 PROCEDURE — 94770 HC EXHALED C02 TEST: CPT

## 2018-03-21 PROCEDURE — 25000003 PHARM REV CODE 250: Performed by: INTERNAL MEDICINE

## 2018-03-21 PROCEDURE — 99900035 HC TECH TIME PER 15 MIN (STAT)

## 2018-03-21 PROCEDURE — 94799 UNLISTED PULMONARY SVC/PX: CPT

## 2018-03-21 PROCEDURE — 63600175 PHARM REV CODE 636 W HCPCS: Performed by: PHYSICIAN ASSISTANT

## 2018-03-21 PROCEDURE — S0030 INJECTION, METRONIDAZOLE: HCPCS | Performed by: SURGERY

## 2018-03-21 PROCEDURE — 94761 N-INVAS EAR/PLS OXIMETRY MLT: CPT

## 2018-03-21 PROCEDURE — 63600175 PHARM REV CODE 636 W HCPCS: Performed by: SURGERY

## 2018-03-21 PROCEDURE — 25000003 PHARM REV CODE 250: Performed by: NURSE PRACTITIONER

## 2018-03-21 PROCEDURE — 97110 THERAPEUTIC EXERCISES: CPT

## 2018-03-21 PROCEDURE — 27000221 HC OXYGEN, UP TO 24 HOURS

## 2018-03-21 PROCEDURE — 25000003 PHARM REV CODE 250: Performed by: PHYSICIAN ASSISTANT

## 2018-03-21 PROCEDURE — 97116 GAIT TRAINING THERAPY: CPT

## 2018-03-21 RX ORDER — TOPIRAMATE 100 MG/1
100 TABLET, FILM COATED ORAL 2 TIMES DAILY
Status: DISCONTINUED | OUTPATIENT
Start: 2018-03-21 | End: 2018-03-21

## 2018-03-21 RX ORDER — TIZANIDINE 4 MG/1
4 TABLET ORAL NIGHTLY
Status: DISCONTINUED | OUTPATIENT
Start: 2018-03-21 | End: 2018-03-22 | Stop reason: HOSPADM

## 2018-03-21 RX ORDER — SUMATRIPTAN SUCCINATE 100 MG/1
100 TABLET ORAL
Status: DISCONTINUED | OUTPATIENT
Start: 2018-03-21 | End: 2018-03-21

## 2018-03-21 RX ORDER — SUMATRIPTAN SUCCINATE 100 MG/1
100 TABLET ORAL
Status: DISCONTINUED | OUTPATIENT
Start: 2018-03-21 | End: 2018-03-22 | Stop reason: HOSPADM

## 2018-03-21 RX ORDER — GABAPENTIN 100 MG/1
100 CAPSULE ORAL 3 TIMES DAILY
Status: DISCONTINUED | OUTPATIENT
Start: 2018-03-21 | End: 2018-03-22 | Stop reason: HOSPADM

## 2018-03-21 RX ORDER — TOPIRAMATE 100 MG/1
100 TABLET, FILM COATED ORAL 2 TIMES DAILY
Status: DISCONTINUED | OUTPATIENT
Start: 2018-03-21 | End: 2018-03-22 | Stop reason: HOSPADM

## 2018-03-21 RX ORDER — HYDROCODONE BITARTRATE AND ACETAMINOPHEN 5; 325 MG/1; MG/1
1 TABLET ORAL EVERY 4 HOURS PRN
Status: DISCONTINUED | OUTPATIENT
Start: 2018-03-21 | End: 2018-03-22 | Stop reason: HOSPADM

## 2018-03-21 RX ORDER — HYDROCODONE BITARTRATE AND ACETAMINOPHEN 10; 325 MG/1; MG/1
1 TABLET ORAL EVERY 4 HOURS PRN
Status: DISCONTINUED | OUTPATIENT
Start: 2018-03-21 | End: 2018-03-22 | Stop reason: HOSPADM

## 2018-03-21 RX ORDER — HYDROMORPHONE HYDROCHLORIDE 2 MG/ML
0.5 INJECTION, SOLUTION INTRAMUSCULAR; INTRAVENOUS; SUBCUTANEOUS EVERY 6 HOURS PRN
Status: DISCONTINUED | OUTPATIENT
Start: 2018-03-21 | End: 2018-03-22 | Stop reason: HOSPADM

## 2018-03-21 RX ORDER — PANTOPRAZOLE SODIUM 40 MG/1
40 TABLET, DELAYED RELEASE ORAL DAILY
Status: DISCONTINUED | OUTPATIENT
Start: 2018-03-21 | End: 2018-03-22 | Stop reason: HOSPADM

## 2018-03-21 RX ADMIN — HYDROCODONE BITARTRATE AND ACETAMINOPHEN 1 TABLET: 10; 325 TABLET ORAL at 09:03

## 2018-03-21 RX ADMIN — CEFTRIAXONE SODIUM 2 G: 2 INJECTION, POWDER, FOR SOLUTION INTRAMUSCULAR; INTRAVENOUS at 05:03

## 2018-03-21 RX ADMIN — TOPIRAMATE 100 MG: 100 TABLET, FILM COATED ORAL at 08:03

## 2018-03-21 RX ADMIN — METRONIDAZOLE 500 MG: 500 INJECTION, SOLUTION INTRAVENOUS at 01:03

## 2018-03-21 RX ADMIN — ONDANSETRON 4 MG: 2 INJECTION INTRAMUSCULAR; INTRAVENOUS at 10:03

## 2018-03-21 RX ADMIN — TIZANIDINE 4 MG: 4 TABLET ORAL at 09:03

## 2018-03-21 RX ADMIN — ENOXAPARIN SODIUM 40 MG: 100 INJECTION SUBCUTANEOUS at 09:03

## 2018-03-21 RX ADMIN — ACETAMINOPHEN 650 MG: 325 TABLET ORAL at 12:03

## 2018-03-21 RX ADMIN — METRONIDAZOLE 500 MG: 500 INJECTION, SOLUTION INTRAVENOUS at 05:03

## 2018-03-21 RX ADMIN — RAMELTEON 8 MG: 8 TABLET, FILM COATED ORAL at 10:03

## 2018-03-21 RX ADMIN — TOPIRAMATE 100 MG: 100 TABLET, FILM COATED ORAL at 02:03

## 2018-03-21 RX ADMIN — CHLORHEXIDINE GLUCONATE 10 ML: 1.2 RINSE ORAL at 09:03

## 2018-03-21 RX ADMIN — SUMATRIPTAN SUCCINATE 100 MG: 100 TABLET ORAL at 03:03

## 2018-03-21 RX ADMIN — METRONIDAZOLE 500 MG: 500 INJECTION, SOLUTION INTRAVENOUS at 09:03

## 2018-03-21 RX ADMIN — PANTOPRAZOLE SODIUM 40 MG: 40 TABLET, DELAYED RELEASE ORAL at 09:03

## 2018-03-21 RX ADMIN — HYDROMORPHONE HYDROCHLORIDE 0.5 MG: 2 INJECTION INTRAMUSCULAR; INTRAVENOUS; SUBCUTANEOUS at 06:03

## 2018-03-21 RX ADMIN — HYDROCODONE BITARTRATE AND ACETAMINOPHEN 1 TABLET: 10; 325 TABLET ORAL at 05:03

## 2018-03-21 RX ADMIN — CHLORHEXIDINE GLUCONATE 10 ML: 1.2 RINSE ORAL at 08:03

## 2018-03-21 NOTE — PLAN OF CARE
Problem: Patient Care Overview  Goal: Plan of Care Review  Outcome: Ongoing (interventions implemented as appropriate)  POC reviewed, including indications and possible side effects of administered medications. Patient verbalized understanding and teach back. No adverse reactions noted. Patient c/o of pain to incision on abdomen. PCA pump in use. Promoted repositioning, ambulation and relaxation techniques. Patient is tolerating ice chips and small sips of water well. Denies n/v. Dressing to abdomen in place with dried drainage that has not spread beyond marked area. Patient is on heart monitor #[id number] and remains in normal sinus rhythm during shift. IV antibiotics and fluids infusing per order. VS remain stable. Patient remains free of falls. Will continue to monitor.     12 hour chart check complete.

## 2018-03-21 NOTE — PROGRESS NOTES
[facility] -   General Surgery  Progress Note    Subjective:     History of Present Illness:  57-year-old female referred for pelvic abscess with fistulous connection to small bowel concerning for perforation.  The patient presented with lower abdominal pain which she said began after eating pizza yesterday.  She she reports nausea, vomiting, diarrhea that began after eating pizza and her lower abdominal pain worsen.  Denies any fever/chills, dysuria or any other complaints.    Post-Op Info:  Procedure(s) (LRB):  EXPLORATORY-LAPAROTOMY (N/A)  RESECTION-SMALL BOWEL MECKEL'S DIVERTICULUM (N/A)   3 Days Post-Op     Interval History: +bm/flatus. No nausea. Pain controlled.     Medications:  Continuous Infusions:   dextrose 5 % and 0.9 % NaCl 100 mL/hr at 03/20/18 2152     Scheduled Meds:   cefTRIAXone (ROCEPHIN) IVPB  2 g Intravenous Q12H    chlorhexidine  10 mL Mouth/Throat BID    enoxaparin  40 mg Subcutaneous Daily    gabapentin  100 mg Oral TID    metronidazole  500 mg Intravenous Q8H    nozaseptin   Each Nare BID    pantoprazole  40 mg Oral Daily     PRN Meds:acetaminophen, cloNIDine, dextrose 50%, diphenhydrAMINE, glucagon (human recombinant), hydrocodone-acetaminophen 10-325mg, hydrocodone-acetaminophen 5-325mg, HYDROmorphone, insulin aspart U-100, ondansetron, promethazine, ramelteon, sodium chloride 0.9%     Review of patient's allergies indicates:   Allergen Reactions    Nexium [esomeprazole magnesium] Other (See Comments)     Headache     Objective:     Vital Signs (Most Recent):  Temp: 97.7 °F (36.5 °C) (03/21/18 0821)  Pulse: 62 (03/21/18 0911)  Resp: 20 (03/21/18 0821)  BP: (!) 114/55 (03/21/18 0821)  SpO2: 95 % (03/21/18 0821) Vital Signs (24h Range):  Temp:  [97.7 °F (36.5 °C)-98.6 °F (37 °C)] 97.7 °F (36.5 °C)  Pulse:  [55-64] 62  Resp:  [16-20] 20  SpO2:  [95 %-100 %] 95 %  BP: (109-119)/(55-66) 114/55     Weight: 63.5 kg (140 lb)  Body mass index is 25.61 kg/m².    Intake/Output  - Last 3 Shifts       03/19 0700 - 03/20 0659 03/20 0700 - 03/21 0659 03/21 0700 - 03/22 0659    P.O. 0  0    I.V. (mL/kg) 3666.5 (57.7) 1989 (31.3)     IV Piggyback 450 650     Total Intake(mL/kg) 4116.5 (64.8) 2639 (41.6) 0 (0)    Urine (mL/kg/hr) 1350 (0.9) 200 (0.1) 200 (1.2)    Emesis/NG output       Stool   0 (0)    Blood       Total Output 1350 200 200    Net +2766.5 +2439 -200           Urine Occurrence  4 x     Stool Occurrence   1 x          Physical Exam   Constitutional: She appears well-developed and well-nourished.   HENT:   Head: Normocephalic and atraumatic.   Eyes: EOM are normal.   Cardiovascular: Normal rate and regular rhythm.    Pulmonary/Chest: Effort normal. No respiratory distress.   Abdominal: Soft. Bowel sounds are normal. She exhibits no distension. Tenderness: mild incisional.   Incision is c/d/i   Musculoskeletal: Normal range of motion.   Skin: Skin is warm and dry.   Psychiatric: She has a normal mood and affect. Thought content normal.   Vitals reviewed.      Significant Labs:  CBC:   Recent Labs  Lab 03/20/18  0518   WBC 7.69   RBC 3.60*   HGB 11.3*   HCT 33.0*   *   MCV 92   MCH 31.4*   MCHC 34.2     BMP:   Recent Labs  Lab 03/20/18  0518   GLU 68*      K 3.6      CO2 22*   BUN 6   CREATININE 0.6   CALCIUM 8.1*   MG 1.6       Significant Diagnostics:  I have reviewed all pertinent imaging results/findings within the past 24 hours.    Assessment/Plan:     * Small bowel perforation with abscess due to Meckel's diverticulum s/p small bowel resection     S/p laparotomy with resection meckels diverticulum.   D/c pca and start PO analgesics with IV for breakthrough pain.   Restart appropriate home meds  Incentive spirometer   ambulate  Continue antibiotics  Sips of clear liquids          Meckel's diverticulum    S/p laparotomy with resection meckels diverticulum.   Continue PCA for pain control  Incentive spirometer  Out of bed, PT eval and treat  Continue antibiotics             Manjula Dover PA-C  General Surgery  [facility] [place]

## 2018-03-21 NOTE — PLAN OF CARE
Problem: Physical Therapy Goal  Goal: Physical Therapy Goal  In one week pt will be able to:  1 sit to/from supine with giuseppe  2. T/f with SPV  3. Gait w or w/o  ft with SPV   Outcome: Ongoing (interventions implemented as appropriate)  Pt gt trained x 320' with rw and sba

## 2018-03-21 NOTE — SUBJECTIVE & OBJECTIVE
Interval History: +bm/flatus. No nausea. Pain controlled.     Medications:  Continuous Infusions:   dextrose 5 % and 0.9 % NaCl 100 mL/hr at 03/20/18 2152     Scheduled Meds:   cefTRIAXone (ROCEPHIN) IVPB  2 g Intravenous Q12H    chlorhexidine  10 mL Mouth/Throat BID    enoxaparin  40 mg Subcutaneous Daily    gabapentin  100 mg Oral TID    metronidazole  500 mg Intravenous Q8H    nozaseptin   Each Nare BID    pantoprazole  40 mg Oral Daily     PRN Meds:acetaminophen, cloNIDine, dextrose 50%, diphenhydrAMINE, glucagon (human recombinant), hydrocodone-acetaminophen 10-325mg, hydrocodone-acetaminophen 5-325mg, HYDROmorphone, insulin aspart U-100, ondansetron, promethazine, ramelteon, sodium chloride 0.9%     Review of patient's allergies indicates:   Allergen Reactions    Nexium [esomeprazole magnesium] Other (See Comments)     Headache     Objective:     Vital Signs (Most Recent):  Temp: 97.7 °F (36.5 °C) (03/21/18 0821)  Pulse: 62 (03/21/18 0911)  Resp: 20 (03/21/18 0821)  BP: (!) 114/55 (03/21/18 0821)  SpO2: 95 % (03/21/18 0821) Vital Signs (24h Range):  Temp:  [97.7 °F (36.5 °C)-98.6 °F (37 °C)] 97.7 °F (36.5 °C)  Pulse:  [55-64] 62  Resp:  [16-20] 20  SpO2:  [95 %-100 %] 95 %  BP: (109-119)/(55-66) 114/55     Weight: 63.5 kg (140 lb)  Body mass index is 25.61 kg/m².    Intake/Output - Last 3 Shifts       03/19 0700 - 03/20 0659 03/20 0700 - 03/21 0659 03/21 0700 - 03/22 0659    P.O. 0  0    I.V. (mL/kg) 3666.5 (57.7) 1989 (31.3)     IV Piggyback 450 650     Total Intake(mL/kg) 4116.5 (64.8) 2639 (41.6) 0 (0)    Urine (mL/kg/hr) 1350 (0.9) 200 (0.1) 200 (1.2)    Emesis/NG output       Stool   0 (0)    Blood       Total Output 1350 200 200    Net +2766.5 +2439 -200           Urine Occurrence  4 x     Stool Occurrence   1 x          Physical Exam   Constitutional: She appears well-developed and well-nourished.   HENT:   Head: Normocephalic and atraumatic.   Eyes: EOM are normal.   Cardiovascular: Normal  rate and regular rhythm.    Pulmonary/Chest: Effort normal. No respiratory distress.   Abdominal: Soft. Bowel sounds are normal. She exhibits no distension. Tenderness: mild incisional.   Incision is c/d/i   Musculoskeletal: Normal range of motion.   Skin: Skin is warm and dry.   Psychiatric: She has a normal mood and affect. Thought content normal.   Vitals reviewed.      Significant Labs:  CBC:   Recent Labs  Lab 03/20/18  0518   WBC 7.69   RBC 3.60*   HGB 11.3*   HCT 33.0*   *   MCV 92   MCH 31.4*   MCHC 34.2     BMP:   Recent Labs  Lab 03/20/18  0518   GLU 68*      K 3.6      CO2 22*   BUN 6   CREATININE 0.6   CALCIUM 8.1*   MG 1.6       Significant Diagnostics:  I have reviewed all pertinent imaging results/findings within the past 24 hours.

## 2018-03-21 NOTE — SUBJECTIVE & OBJECTIVE
Review of Systems   Constitutional: Negative for appetite change, chills, diaphoresis, fatigue, fever and unexpected weight change.   HENT: Negative for congestion, nosebleeds, sinus pressure and sore throat.    Eyes: Negative for pain, discharge and visual disturbance.   Respiratory: Negative for cough, chest tightness, shortness of breath, wheezing and stridor.    Cardiovascular: Negative for chest pain, palpitations and leg swelling.   Gastrointestinal: Positive for abdominal pain. Negative for abdominal distention, blood in stool, constipation, diarrhea, nausea and vomiting.   Endocrine: Negative for cold intolerance and heat intolerance.   Genitourinary: Negative for difficulty urinating, dysuria, flank pain, frequency and urgency.   Musculoskeletal: Negative for arthralgias, back pain, joint swelling, myalgias, neck pain and neck stiffness.   Skin: Negative for rash and wound.   Allergic/Immunologic: Negative for food allergies and immunocompromised state.   Neurological: Positive for headaches. Negative for dizziness, seizures, syncope, facial asymmetry, speech difficulty, weakness, light-headedness and numbness.   Hematological: Negative for adenopathy.   Psychiatric/Behavioral: Negative for agitation, confusion and hallucinations.     Objective:     Vital Signs (Most Recent):  Temp: 98.1 °F (36.7 °C) (03/21/18 1405)  Pulse: (!) 56 (03/21/18 1405)  Resp: 20 (03/21/18 1207)  BP: 121/61 (03/21/18 1405)  SpO2: 96 % (03/21/18 1405) Vital Signs (24h Range):  Temp:  [97.7 °F (36.5 °C)-98.6 °F (37 °C)] 98.1 °F (36.7 °C)  Pulse:  [55-69] 56  Resp:  [16-20] 20  SpO2:  [93 %-100 %] 96 %  BP: (109-121)/(55-66) 121/61     Weight: 63.5 kg (140 lb)  Body mass index is 25.61 kg/m².    Physical Exam   Constitutional: She is oriented to person, place, and time. She appears well-developed and well-nourished. No distress.   HENT:   Head: Normocephalic and atraumatic.   Nose: Nose normal.   Mouth/Throat: Oropharynx is clear  and moist.   Eyes: Conjunctivae are normal. No scleral icterus.   Neck: Normal range of motion. Neck supple. No tracheal deviation present.   Cardiovascular: Normal rate, regular rhythm, normal heart sounds and intact distal pulses.  Exam reveals no gallop and no friction rub.    No murmur heard.  Pulmonary/Chest: Effort normal and breath sounds normal. No stridor. No respiratory distress. She has no wheezes. She has no rales. She exhibits no tenderness.   Abdominal: Soft. Bowel sounds are normal. She exhibits no distension and no mass. There is tenderness (Incisional ). There is no rebound and no guarding.   Musculoskeletal: Normal range of motion. She exhibits no edema, tenderness or deformity.   Neurological: She is alert and oriented to person, place, and time. No cranial nerve deficit. She exhibits normal muscle tone. Coordination normal.   Skin: Skin is warm and dry. No rash noted. She is not diaphoretic. No erythema. No pallor.   Psychiatric: She has a normal mood and affect. Her behavior is normal. Thought content normal.           Significant Labs:   BMP:     Recent Labs  Lab 03/20/18 0518   GLU 68*      K 3.6      CO2 22*   BUN 6   CREATININE 0.6   CALCIUM 8.1*   MG 1.6     CBC:     Recent Labs  Lab 03/20/18 0518   WBC 7.69   HGB 11.3*   HCT 33.0*   *     CMP:     Recent Labs  Lab 03/20/18 0518      K 3.6      CO2 22*   GLU 68*   BUN 6   CREATININE 0.6   CALCIUM 8.1*   ANIONGAP 6*   EGFRNONAA >60     All pertinent labs within the past 24 hours have been reviewed.    Significant Imaging:  Imaging Results          CT Abdomen Pelvis With Contrast (Final result)  Result time 03/18/18 15:14:22    Final result by Stuart Siegel MD (03/18/18 15:14:22)                 Impression:           Small thick walled fluid collection measuring 2.1 x 3.2 x 2.9 cm in the right hemipelvis consistent with an abscess.  Adjacent mildly dilated thick walled loop of bowel containing enteric  contrast with 2 abnormal communications to the adjacent ileum.  Differential considerations include focal contained perforation/fistula formation secondary to inflammatory bowel disease or surgery.      All CT scans at this facility use dose modulation, iterative reconstruction and/or weight based dosing when appropriate to reduce radiation dose to as low as reasonably achievable.   Electronically signed by: DEVIN SIEGEL MD  Date:     03/18/18  Time:    15:14              Narrative:    Exam: CT ABDOMEN PELVIS WITH CONTRAST  Technique: Axial CT imaging was performed through the abdomen and pelvis with  75cc  of intravenous contrast. Multiplanar reformats were performed and interpreted.  Enteric contrast was utilized.  Clinical History:  Abdominal pain.  Infection, abdomen-pelvis right pelvis abscess ? bowel perforation     Comparison: CT abdomen and pelvis and pelvic ultrasound performed earlier today.  Findings:       There is a thickwalled fluid collection containing a bubble of air again demonstrated in the right hemipelvis dense abuts a mildly distended loop, thick walled loop of small bowel containing enteric contrast that has an abnormal configuration and communication with the adjacent small bowel loops.  The collection measures 2.1 x 3.2 x 2.9 cm most consistent with an abscess.  The measurements of the collection on the previous CT exam included the dilated loop of small bowel which is contiguous with the abscess.  The mildly dilated thick walled bowel mentioned above measures approximately 5 cm in length and appears to have 2 fistulous communications with the adjacent normal ileum.  Adjacent surgical clips in the right hemipelvis.  The terminal ileum is within normal limits.                             US Pelvis Complete Non OB (Final result)  Result time 03/18/18 12:52:59    Final result by Devin Siegel MD (03/18/18 12:52:59)                 Impression:     Bowel gas limits evaluation.  The thickwalled  fluid collection in the right hemipelvis was identified.  Electronically signed by: DEVIN SIEGEL MD  Date:     03/18/18  Time:    12:52              Narrative:    Exam: US PELVIS COMPLETE NON OB  Clinical History:    Abdominal pain.  Abnormal CT examination.  Findings:     The uterus is normal in size and echotexture with a normal 3 mm endometrial stripe.  1.3 cm intramural posterior uterine wall fibroid.  The right ovary and adnexa was visualized secondary to bowel gas.  Fluid-filled structure with peristalsis adjacent to the left ovary appears to represent a small bowel loop.  The left ovary measures 2.4 x 2.4 x 2.8 cm.  No free pelvic fluid.                             CT Renal Stone Study ABD Pelvis WO (Final result)  Result time 03/18/18 11:26:37    Final result by Devin Siegel MD (03/18/18 11:26:37)                 Impression:           Thickwalled complex fluid collection in the right hemipelvis containing air measuring 4.0 x 5.5 cm suspicious for an abscess.  Differential considerations include a tubo-ovarian abscess or focal small bowel perforation with abscess.  Recommend a repeat study with enteric contrast following at least a 90 minute delay.  Also, an ultrasound may be helpful to evaluate the fluid collections relationship with the right ovary.      All CT scans at this facility use dose modulation, iterative reconstruction and/or weight based dosing when appropriate to reduce radiation dose to as low as reasonably achievable.   Electronically signed by: DEVIN SIEGEL MD  Date:     03/18/18  Time:    11:26              Narrative:    Exam: CT RENAL STONE STUDY ABD PELVIS WO  Technique: Axial CT images performed through the abdomen and pelvis without intravenous contrast. Multiplanar reformats were performed and interpreted.  Comparison: CT abdomen on October 9, 2016  Clinical History: Abdominal pain. Flank pain, stone disease suspected Hematuria      Findings:       Lung bases are clear.  No  urolithiasis, hydronephrosis, or perinephric stranding.  Mildly complex right interpolar renal cyst measuring up to 7 cm.  No hydronephrosis or urolithiasis.  The liver, spleen, adrenal glands, and pancreas have a normal noncontrasted appearance.   The gallbladder is unremarkable.  No free fluid, free air.  The bowel is nondistended and within normal limits.  The appendix is normal.  In the right hemipelvis, there is a thick walled complex fluid collection measuring 4.0 x 5.5 cm containing bubbles of air that is contiguous with the right ovary.  A loop of small bowel is contiguous with the fluid collection.  The fluid collection also abuts the fundus of the uterus.  The abdominal aorta is normal in caliber.  The urinary bladder is unremarkable.     No significant osseous abnormality is identified.

## 2018-03-21 NOTE — PT/OT/SLP PROGRESS
Physical Therapy  Treatment    [facility]   MRN: [id number]   Admitting Diagnosis: Small bowel perforation    PT Received On: 03/21/18  PT Start Time: 1100     PT Stop Time: 1125    PT Total Time (min): 25 min       Billable Minutes:  Gait Training 15 and Therapeutic Exercise 10    Treatment Type: Treatment  PT/PTA: PT             General Precautions: Standard, fall  Orthopedic Precautions: N/A   Braces: N/A         Subjective:  Communicated with NURSE ABE AND EPIC CHART REVIEW prior to session.  PT AGREED TO TX     Pain/Comfort  Pain Rating 1: 6/10  Location 1: abdomen  Pain Rating Post-Intervention 1: 6/10    Objective:   Patient found with: peripheral IV, PCA, telemetry    Functional Mobility:  PT SUP.SIT EOB WITH USE OF BED RAIL AND SBA. PT STOOD WITH RW AND S FOR GT X 320' WITH SLOW PACE GT. PT RETURNED TO RM T/F TO CHAIR WITH SBA. PT SEATED FOR B LE TE X 20 REPS X AP, TKE, MIP AND GLUT SETS. PT LEFT SEATED WITH ALL NEEDS MET.     AM-PAC 6 CLICK MOBILITY  How much help from another person does this patient currently need?   1 = Unable, Total/Dependent Assistance  2 = A lot, Maximum/Moderate Assistance  3 = A little, Minimum/Contact Guard/Supervision  4 = None, Modified [place]/Independent    Turning over in bed (including adjusting bedclothes, sheets and blankets)?: 4  Sitting down on and standing up from a chair with arms (e.g., wheelchair, bedside commode, etc.): 4  Moving from lying on back to sitting on the side of the bed?: 4  Moving to and from a bed to a chair (including a wheelchair)?: 4  Need to walk in hospital room?: 4  Climbing 3-5 steps with a railing?: 1  Total Score: 21    AM-PAC Raw Score CMS G-Code Modifier Level of Impairment Assistance   6 % Total / Unable   7 - 9 CM 80 - 100% Maximal Assist   10 - 14 CL 60 - 80% Moderate Assist   15 - 19 CK 40 - 60% Moderate Assist   20 - 22 CJ 20 - 40% Minimal Assist   23 CI 1-20% SBA / CGA   24 CH 0% Independent/ Mod I      Patient left up in chair with call button in reach.    Assessment:  PT TARAH TX WELL PROGRESSING WITH GT.     Rehab identified problem list/impairments: Rehab identified problem list/impairments: weakness, impaired endurance, impaired functional mobilty, impaired balance, gait instability, pain    Rehab potential is excellent.    Activity tolerance: Good    Discharge recommendations: Discharge Facility/Level Of Care Needs: home health PT     Barriers to discharge:      Equipment recommendations: Equipment Needed After Discharge: none     GOALS:    Physical Therapy Goals        Problem: Physical Therapy Goal    Goal Priority Disciplines Outcome Goal Variances Interventions   Physical Therapy Goal     PT/OT, PT Ongoing (interventions implemented as appropriate)     Description:  In one week pt will be able to:  1 sit to/from supine with giuseppe  2. T/f with SPV  3. Gait w or w/o  ft with SPV                    PLAN:    Patient to be seen 5 x/week  to address the above listed problems via gait training, therapeutic activities, therapeutic exercises  Plan of Care expires: 03/26/18  Plan of Care reviewed with: patient         Shefali Goode, PT  03/21/2018

## 2018-03-21 NOTE — ASSESSMENT & PLAN NOTE
S/p laparotomy with resection meckels diverticulum.   D/c pca and start PO analgesics with IV for breakthrough pain.   Restart appropriate home meds  Incentive spirometer   ambulate  Continue antibiotics  Sips of clear liquids

## 2018-03-21 NOTE — ASSESSMENT & PLAN NOTE
Cont PCA for pain control  Supportive care  PT/OT  Cont IV Rocephin and Flagyl   IS  Diet advanced

## 2018-03-21 NOTE — NURSING
Notified Dr. Hernandez via spectralink message pt vomited 225ml. She stated it was d/t an impending migraine. Pt was recently advanced to clear liquid which she stated did not make her sick.

## 2018-03-21 NOTE — PROGRESS NOTES
[facility] Hospital Medicine  Progress Note    [facility]  MRN: [id number]  Patient Class: IP- Inpatient   Admission Date: 3/18/2018  Length of Stay: 3 days  Attending Physician: Thom Hernandez MD  Primary Care Provider: oTm Love Jr, MD        Subjective:     Principal Problem:Small bowel perforation    HPI:  57F h/o GERD and nephrolithiasis presents with lower abdominal pain which began after eating pizza yesterday.  Associated with n/v and decreased oral intake.   Denies fevers, chills, constipation and dysuria.  Reports one episode of watery stool yesterday.  None since then.  No bloody bowel movements.  No other alleviating or exacerbating factors.   She denies family history of IBD.  Does report her mother has history of diverticulosis and fistula.   In Er, CT abdomen show Small thick walled fluid collection measuring 2.1 x 3.2 x 2.9 cm in the right hemipelvis consistent with an abscess.  Adjacent mildly dilated thick walled loop of bowel containing enteric contrast with 2 abnormal communications to the adjacent ileum.    Patient was taken to surgery (dr. Hernandez) for ex lap for washout and possible small bowel resection.  Hospital medicine called for consult on medical management.     Hospital Course:  The pt was admitted with pelvic abscess-concerning for SBO with perforation on IV Rocephin and Flagyl. Care discussed with General surgery. Pt underwent Ex Lap and Small bowel resection due to inflamed meckel's diverticulum. Pt tolerated surgery well. No complaints except incisional pain - controlled with PCA. Ambulating.   + Flatus and BM today. Diet advanced  Complain of migraine HA- hx migraines. Topamax restarted. Triptan prn         Review of Systems   Constitutional: Negative for appetite change, chills, diaphoresis, fatigue, fever and unexpected weight change.   HENT: Negative for congestion, nosebleeds, sinus pressure and sore throat.    Eyes:  Negative for pain, discharge and visual disturbance.   Respiratory: Negative for cough, chest tightness, shortness of breath, wheezing and stridor.    Cardiovascular: Negative for chest pain, palpitations and leg swelling.   Gastrointestinal: Positive for abdominal pain. Negative for abdominal distention, blood in stool, constipation, diarrhea, nausea and vomiting.   Endocrine: Negative for cold intolerance and heat intolerance.   Genitourinary: Negative for difficulty urinating, dysuria, flank pain, frequency and urgency.   Musculoskeletal: Negative for arthralgias, back pain, joint swelling, myalgias, neck pain and neck stiffness.   Skin: Negative for rash and wound.   Allergic/Immunologic: Negative for food allergies and immunocompromised state.   Neurological: Positive for headaches. Negative for dizziness, seizures, syncope, facial asymmetry, speech difficulty, weakness, light-headedness and numbness.   Hematological: Negative for adenopathy.   Psychiatric/Behavioral: Negative for agitation, confusion and hallucinations.     Objective:     Vital Signs (Most Recent):  Temp: 98.1 °F (36.7 °C) (03/21/18 1405)  Pulse: (!) 56 (03/21/18 1405)  Resp: 20 (03/21/18 1207)  BP: 121/61 (03/21/18 1405)  SpO2: 96 % (03/21/18 1405) Vital Signs (24h Range):  Temp:  [97.7 °F (36.5 °C)-98.6 °F (37 °C)] 98.1 °F (36.7 °C)  Pulse:  [55-69] 56  Resp:  [16-20] 20  SpO2:  [93 %-100 %] 96 %  BP: (109-121)/(55-66) 121/61     Weight: 63.5 kg (140 lb)  Body mass index is 25.61 kg/m².    Physical Exam   Constitutional: She is oriented to person, place, and time. She appears well-developed and well-nourished. No distress.   HENT:   Head: Normocephalic and atraumatic.   Nose: Nose normal.   Mouth/Throat: Oropharynx is clear and moist.   Eyes: Conjunctivae are normal. No scleral icterus.   Neck: Normal range of motion. Neck supple. No tracheal deviation present.   Cardiovascular: Normal rate, regular rhythm, normal heart sounds and intact  distal pulses.  Exam reveals no gallop and no friction rub.    No murmur heard.  Pulmonary/Chest: Effort normal and breath sounds normal. No stridor. No respiratory distress. She has no wheezes. She has no rales. She exhibits no tenderness.   Abdominal: Soft. Bowel sounds are normal. She exhibits no distension and no mass. There is tenderness (Incisional ). There is no rebound and no guarding.   Musculoskeletal: Normal range of motion. She exhibits no edema, tenderness or deformity.   Neurological: She is alert and oriented to person, place, and time. No cranial nerve deficit. She exhibits normal muscle tone. Coordination normal.   Skin: Skin is warm and dry. No rash noted. She is not diaphoretic. No erythema. No pallor.   Psychiatric: She has a normal mood and affect. Her behavior is normal. Thought content normal.           Significant Labs:   BMP:     Recent Labs  Lab 03/20/18 0518   GLU 68*      K 3.6      CO2 22*   BUN 6   CREATININE 0.6   CALCIUM 8.1*   MG 1.6     CBC:     Recent Labs  Lab 03/20/18 0518   WBC 7.69   HGB 11.3*   HCT 33.0*   *     CMP:     Recent Labs  Lab 03/20/18 0518      K 3.6      CO2 22*   GLU 68*   BUN 6   CREATININE 0.6   CALCIUM 8.1*   ANIONGAP 6*   EGFRNONAA >60     All pertinent labs within the past 24 hours have been reviewed.    Significant Imaging:  Imaging Results          CT Abdomen Pelvis With Contrast (Final result)  Result time 03/18/18 15:14:22    Final result by Stuart Siegel MD (03/18/18 15:14:22)                 Impression:           Small thick walled fluid collection measuring 2.1 x 3.2 x 2.9 cm in the right hemipelvis consistent with an abscess.  Adjacent mildly dilated thick walled loop of bowel containing enteric contrast with 2 abnormal communications to the adjacent ileum.  Differential considerations include focal contained perforation/fistula formation secondary to inflammatory bowel disease or surgery.      All CT scans at this  facility use dose modulation, iterative reconstruction and/or weight based dosing when appropriate to reduce radiation dose to as low as reasonably achievable.   Electronically signed by: DEVIN SIEGEL MD  Date:     03/18/18  Time:    15:14              Narrative:    Exam: CT ABDOMEN PELVIS WITH CONTRAST  Technique: Axial CT imaging was performed through the abdomen and pelvis with  75cc  of intravenous contrast. Multiplanar reformats were performed and interpreted.  Enteric contrast was utilized.  Clinical History:  Abdominal pain.  Infection, abdomen-pelvis right pelvis abscess ? bowel perforation     Comparison: CT abdomen and pelvis and pelvic ultrasound performed earlier today.  Findings:       There is a thickwalled fluid collection containing a bubble of air again demonstrated in the right hemipelvis dense abuts a mildly distended loop, thick walled loop of small bowel containing enteric contrast that has an abnormal configuration and communication with the adjacent small bowel loops.  The collection measures 2.1 x 3.2 x 2.9 cm most consistent with an abscess.  The measurements of the collection on the previous CT exam included the dilated loop of small bowel which is contiguous with the abscess.  The mildly dilated thick walled bowel mentioned above measures approximately 5 cm in length and appears to have 2 fistulous communications with the adjacent normal ileum.  Adjacent surgical clips in the right hemipelvis.  The terminal ileum is within normal limits.                             US Pelvis Complete Non OB (Final result)  Result time 03/18/18 12:52:59    Final result by Devin Siegel MD (03/18/18 12:52:59)                 Impression:     Bowel gas limits evaluation.  The thickwalled fluid collection in the right hemipelvis was identified.  Electronically signed by: DEVIN SIEGEL MD  Date:     03/18/18  Time:    12:52              Narrative:    Exam: US PELVIS COMPLETE NON OB  Clinical History:    Abdominal  pain.  Abnormal CT examination.  Findings:     The uterus is normal in size and echotexture with a normal 3 mm endometrial stripe.  1.3 cm intramural posterior uterine wall fibroid.  The right ovary and adnexa was visualized secondary to bowel gas.  Fluid-filled structure with peristalsis adjacent to the left ovary appears to represent a small bowel loop.  The left ovary measures 2.4 x 2.4 x 2.8 cm.  No free pelvic fluid.                             CT Renal Stone Study ABD Pelvis WO (Final result)  Result time 03/18/18 11:26:37    Final result by Devin Siegel MD (03/18/18 11:26:37)                 Impression:           Thickwalled complex fluid collection in the right hemipelvis containing air measuring 4.0 x 5.5 cm suspicious for an abscess.  Differential considerations include a tubo-ovarian abscess or focal small bowel perforation with abscess.  Recommend a repeat study with enteric contrast following at least a 90 minute delay.  Also, an ultrasound may be helpful to evaluate the fluid collections relationship with the right ovary.      All CT scans at this facility use dose modulation, iterative reconstruction and/or weight based dosing when appropriate to reduce radiation dose to as low as reasonably achievable.   Electronically signed by: DEVIN SIEGEL MD  Date:     03/18/18  Time:    11:26              Narrative:    Exam: CT RENAL STONE STUDY ABD PELVIS WO  Technique: Axial CT images performed through the abdomen and pelvis without intravenous contrast. Multiplanar reformats were performed and interpreted.  Comparison: CT abdomen on October 9, 2016  Clinical History: Abdominal pain. Flank pain, stone disease suspected Hematuria      Findings:       Lung bases are clear.  No urolithiasis, hydronephrosis, or perinephric stranding.  Mildly complex right interpolar renal cyst measuring up to 7 cm.  No hydronephrosis or urolithiasis.  The liver, spleen, adrenal glands, and pancreas have a normal noncontrasted  appearance.   The gallbladder is unremarkable.  No free fluid, free air.  The bowel is nondistended and within normal limits.  The appendix is normal.  In the right hemipelvis, there is a thick walled complex fluid collection measuring 4.0 x 5.5 cm containing bubbles of air that is contiguous with the right ovary.  A loop of small bowel is contiguous with the fluid collection.  The fluid collection also abuts the fundus of the uterus.  The abdominal aorta is normal in caliber.  The urinary bladder is unremarkable.     No significant osseous abnormality is identified.                            Assessment/Plan:      * Small bowel perforation with abscess due to Meckel's diverticulum s/p small bowel resection     Cont PCA for pain control  Supportive care  PT/OT  Cont IV Rocephin and Flagyl   IS  Diet advanced          Hypokalemia    Replete          Migraine    Restart Topamax  Triptan prn           GERD (gastroesophageal reflux disease)    Continue PPI          Meckel's diverticulum    See above             VTE Risk Mitigation         Ordered     enoxaparin injection 40 mg  Daily     Route:  Subcutaneous        03/18/18 2025     IP VTE LOW RISK PATIENT  Once      03/18/18 2025              Kathi Garcia NP  Department of Hospital Medicine   [facility]

## 2018-03-22 ENCOUNTER — NURSE TRIAGE (OUTPATIENT)
Dept: ADMINISTRATIVE | Facility: CLINIC | Age: 58
End: 2018-03-22

## 2018-03-22 VITALS
BODY MASS INDEX: 25.76 KG/M2 | TEMPERATURE: 98 F | SYSTOLIC BLOOD PRESSURE: 104 MMHG | HEIGHT: 62 IN | DIASTOLIC BLOOD PRESSURE: 67 MMHG | OXYGEN SATURATION: 95 % | RESPIRATION RATE: 18 BRPM | WEIGHT: 140 LBS | HEART RATE: 61 BPM

## 2018-03-22 LAB
ANION GAP SERPL CALC-SCNC: 4 MMOL/L
BASOPHILS # BLD AUTO: 0.02 K/UL
BASOPHILS NFR BLD: 0.4 %
BUN SERPL-MCNC: 3 MG/DL
CALCIUM SERPL-MCNC: 8.4 MG/DL
CHLORIDE SERPL-SCNC: 111 MMOL/L
CO2 SERPL-SCNC: 26 MMOL/L
CREAT SERPL-MCNC: 0.6 MG/DL
DIFFERENTIAL METHOD: ABNORMAL
EOSINOPHIL # BLD AUTO: 0.4 K/UL
EOSINOPHIL NFR BLD: 7.6 %
ERYTHROCYTE [DISTWIDTH] IN BLOOD BY AUTOMATED COUNT: 14 %
EST. GFR  (AFRICAN AMERICAN): >60 ML/MIN/1.73 M^2
EST. GFR  (NON AFRICAN AMERICAN): >60 ML/MIN/1.73 M^2
GLUCOSE SERPL-MCNC: 123 MG/DL
HCT VFR BLD AUTO: 34.3 %
HGB BLD-MCNC: 11.6 G/DL
LYMPHOCYTES # BLD AUTO: 1.4 K/UL
LYMPHOCYTES NFR BLD: 28.6 %
MCH RBC QN AUTO: 30.9 PG
MCHC RBC AUTO-ENTMCNC: 33.8 G/DL
MCV RBC AUTO: 91 FL
MONOCYTES # BLD AUTO: 0.9 K/UL
MONOCYTES NFR BLD: 17.7 %
NEUTROPHILS # BLD AUTO: 2.3 K/UL
NEUTROPHILS NFR BLD: 45.7 %
PLATELET # BLD AUTO: 163 K/UL
PMV BLD AUTO: 9.5 FL
POTASSIUM SERPL-SCNC: 3.3 MMOL/L
RBC # BLD AUTO: 3.76 M/UL
SODIUM SERPL-SCNC: 141 MMOL/L
WBC # BLD AUTO: 4.97 K/UL

## 2018-03-22 PROCEDURE — 25000003 PHARM REV CODE 250: Performed by: PHYSICIAN ASSISTANT

## 2018-03-22 PROCEDURE — 85025 COMPLETE CBC W/AUTO DIFF WBC: CPT

## 2018-03-22 PROCEDURE — 80048 BASIC METABOLIC PNL TOTAL CA: CPT

## 2018-03-22 PROCEDURE — 36415 COLL VENOUS BLD VENIPUNCTURE: CPT

## 2018-03-22 PROCEDURE — 25000003 PHARM REV CODE 250: Performed by: NURSE PRACTITIONER

## 2018-03-22 PROCEDURE — 63600175 PHARM REV CODE 636 W HCPCS: Performed by: SURGERY

## 2018-03-22 PROCEDURE — S0030 INJECTION, METRONIDAZOLE: HCPCS | Performed by: SURGERY

## 2018-03-22 PROCEDURE — 97110 THERAPEUTIC EXERCISES: CPT

## 2018-03-22 PROCEDURE — 97116 GAIT TRAINING THERAPY: CPT

## 2018-03-22 PROCEDURE — 25000003 PHARM REV CODE 250: Performed by: SURGERY

## 2018-03-22 RX ORDER — HYDROCODONE BITARTRATE AND ACETAMINOPHEN 5; 325 MG/1; MG/1
1 TABLET ORAL EVERY 4 HOURS PRN
Qty: 30 TABLET | Refills: 0 | Status: SHIPPED | OUTPATIENT
Start: 2018-03-22 | End: 2018-05-07

## 2018-03-22 RX ADMIN — HYDROCODONE BITARTRATE AND ACETAMINOPHEN 1 TABLET: 10; 325 TABLET ORAL at 10:03

## 2018-03-22 RX ADMIN — PANTOPRAZOLE SODIUM 40 MG: 40 TABLET, DELAYED RELEASE ORAL at 09:03

## 2018-03-22 RX ADMIN — METRONIDAZOLE 500 MG: 500 INJECTION, SOLUTION INTRAVENOUS at 09:03

## 2018-03-22 RX ADMIN — CEFTRIAXONE SODIUM 2 G: 2 INJECTION, POWDER, FOR SOLUTION INTRAMUSCULAR; INTRAVENOUS at 04:03

## 2018-03-22 RX ADMIN — ENOXAPARIN SODIUM 40 MG: 100 INJECTION SUBCUTANEOUS at 09:03

## 2018-03-22 RX ADMIN — CHLORHEXIDINE GLUCONATE 10 ML: 1.2 RINSE ORAL at 09:03

## 2018-03-22 RX ADMIN — METRONIDAZOLE 500 MG: 500 INJECTION, SOLUTION INTRAVENOUS at 12:03

## 2018-03-22 RX ADMIN — TOPIRAMATE 100 MG: 100 TABLET, FILM COATED ORAL at 09:03

## 2018-03-22 NOTE — PT/OT/SLP PROGRESS
Physical Therapy  Treatment    Larisa Rosadofield   MRN: [id number]   Admitting Diagnosis: Small bowel perforation    PT Received On: 03/22/18  PT Start Time: 1006     PT Stop Time: 1030    PT Total Time (min): 24 min       Billable Minutes:  Gait Training 14 and Therapeutic Exercise 10    Treatment Type: Treatment  PT/PTA: PT             General Precautions: Standard, fall  Orthopedic Precautions: N/A   Braces: N/A         Subjective:  Communicated with NURSE DIAMOND AND EPIC CHART REVIEW prior to session.  PT AGREED TO TX     Pain/Comfort  Pain Rating 1: 6/10  Location 1: abdomen  Pain Rating Post-Intervention 1: 6/10    Objective:   Patient found with: PCA, peripheral IV, telemetry    Functional Mobility:  PT SUP>SIT EOB MOD I. PT SCOOTED TO EOB AND STOOD WITH NO AD TO BATHROOM FOR TOILETING IND. PT GT TRAINED X 450' IND AND NO LOB. PT RETURNED TO RM T/F TO CHAIR IND. PT COMPLETED B LE TE X 20 REPS OF MIP, TKE, AND AP. PT LEFT SEATED IN CHAIR WITH ALL NEEDS MET.     AM-PAC 6 CLICK MOBILITY  How much help from another person does this patient currently need?   1 = Unable, Total/Dependent Assistance  2 = A lot, Maximum/Moderate Assistance  3 = A little, Minimum/Contact Guard/Supervision  4 = None, Modified [place]/Independent    Turning over in bed (including adjusting bedclothes, sheets and blankets)?: 4  Sitting down on and standing up from a chair with arms (e.g., wheelchair, bedside commode, etc.): 4  Moving from lying on back to sitting on the side of the bed?: 4  Moving to and from a bed to a chair (including a wheelchair)?: 4  Need to walk in hospital room?: 4  Climbing 3-5 steps with a railing?: 1  Total Score: 21    AM-PAC Raw Score CMS G-Code Modifier Level of Impairment Assistance   6 % Total / Unable   7 - 9 CM 80 - 100% Maximal Assist   10 - 14 CL 60 - 80% Moderate Assist   15 - 19 CK 40 - 60% Moderate Assist   20 - 22 CJ 20 - 40% Minimal Assist   23 CI 1-20% SBA / CGA   24 CH 0%  Independent/ Mod I     Patient left up in chair with call button in reach.    Assessment:  PT TARAH TX WELL AND WILL BE D/C FROM P.T. TO MOBILITY PROGRAM FOR MAINTENANCE.     Rehab identified problem list/impairments: Rehab identified problem list/impairments: weakness, impaired endurance, impaired functional mobilty, impaired balance, gait instability, pain    Discharge recommendations: Discharge Facility/Level Of Care Needs: home     Equipment recommendations: Equipment Needed After Discharge: none     GOALS:    Physical Therapy Goals     Not on file          Multidisciplinary Problems (Resolved)        Problem: Physical Therapy Goal    Goal Priority Disciplines Outcome Goal Variances Interventions   Physical Therapy Goal   (Resolved)     PT/OT, PT Outcome(s) achieved     Description:  In one week pt will be able to:  1 sit to/from supine with giuseppe  2. T/f with SPV  3. Gait w or w/o  ft with SPV                    PLAN:    D/C TO MOBILITY PROGRAM  Plan of Care expires: 03/26/18  Plan of Care reviewed with: patient         Shefali Goode, PT  03/22/2018

## 2018-03-22 NOTE — PLAN OF CARE
Problem: Physical Therapy Goal  Goal: Physical Therapy Goal  In one week pt will be able to:  1 sit to/from supine with giuseppe  2. T/f with SPV  3. Gait w or w/o  ft with SPV   Outcome: Outcome(s) achieved Date Met: 03/22/18  PT GOALS MET. PT WILL BE D/C FROM P.T. TO MOBILITY PROGRAM

## 2018-03-22 NOTE — PROGRESS NOTES
Discharge instructions given, patient verbalized understanding. Dr. Worley wanted to see patient back 4/2 but no available appointments for that date so I made her appointment 4/4. Patient educated and aware of appointment. Waiting on pharmacy to bring medications to bedside. IV removed and tip intact.

## 2018-03-22 NOTE — PLAN OF CARE
Problem: Patient Care Overview  Goal: Plan of Care Review  Outcome: Ongoing (interventions implemented as appropriate)  Patient remains free of falls and safety precautions maintained. Afebrile. Pain controlled. Patient ambulating well. Passing flatus. Patient to be discharged. Will continue to monitor. 12 hour chart check.

## 2018-03-22 NOTE — TELEPHONE ENCOUNTER
"  Reason for Disposition   General information question, no triage required and triager able to answer question    Answer Assessment - Initial Assessment Questions  1. REASON FOR CALL or QUESTION: "What is your reason for calling today?" or "How can I best help you?" or "What question do you have that I can help answer?"       reported wife was discharged today from [facility]. They didn't receive both pain meds they were advised about- picked up norco 5 from pharmacy but the norco 7.5 was not given.    Protocols used: ST INFORMATION ONLY CALL-A-    Advised him I do not see a norco 7.5 prescribed from today- a scrip for this noted on her chart from 2016. Transferred via  to nurses' station for pt's room.  "

## 2018-03-22 NOTE — PHYSICIAN QUERY
PT Name: Larisa Jones  MR #: [id number]     Physician Query Form - Documentation Clarification      CDS/: Brandy E Capley               Contact information:  Spectralink:  [phone]    This form is a permanent document in the medical record.     Query Date: March 22, 2018    By submitting this query, we are merely seeking further clarification of documentation. Please utilize your independent clinical judgment when addressing the question(s) below.    The Medical record reflects the following:    Supporting Clinical Findings Location in Medical Record     The pt was admitted with pelvic abscess-concerning for SBO with perforation on IV Rocephin and Flagyl. Care discussed with General surgery. Pt underwent Ex Lap and Small bowel resection due to inflamed meckel's diverticulum.    * Small bowel perforation with abscess due to Meckel's diverticulum s/p small bowel resection      [facility] medicine progress notes 3/21      General surgery progress notes 3/21     Small thick walled fluid collection measuring 2.1 x 3.2 x 2.9 cm in the right hemipelvis consistent with an abscess.     CT abdomen pelvis 3/18                                                                            Doctor, Please specify diagnosis or diagnoses associated with above clinical findings.  Please specify acuity of pelvic abscess.     Provider Use Only       ( X )  Acute     (  )  Chronic     (  )  Other (please specify):  ________________________________                                                                                                             [  ] Clinically undetermined

## 2018-03-22 NOTE — PLAN OF CARE
Apr2 Follow up with Thom Hernandez MD   Monday Apr 2, 2018   post op  [id number] [address] [place] [address]   [phone]         03/22/18 1427   Final Note   Assessment Type Final Discharge Note   Discharge Disposition Home   Hospital Follow Up  Appt(s) scheduled? Yes   Right Care Referral Info   Post Acute Recommendation No Care

## 2018-03-22 NOTE — PLAN OF CARE
Problem: Patient Care Overview  Goal: Plan of Care Review  Outcome: Ongoing (interventions implemented as appropriate)  Remains free from injury. Incision CDI, open to air, staples in place. Pain assessed and managed with PRN meds. Ambulates and urinating. ABX administered as ordered. Vital signs stable. Chart reviewed. Will continue to monitor.

## 2018-03-27 NOTE — DISCHARGE SUMMARY
[facility]   General Surgery  Discharge Summary      Patient Name: Larisa Jones  MRN: [id number]  Admission Date: 3/18/2018  Hospital Length of Stay: 4 days  Discharge Date and Time:  03/26/2018 10:33 PM  Attending Physician: No att. providers found   Discharging Provider: Dinora Laboy MD  Primary Care Provider: Tom Love Jr, MD    HPI:   57-year-old female referred for pelvic abscess with fistulous connection to small bowel concerning for perforation.  The patient presented with lower abdominal pain which she said began after eating pizza yesterday.  She she reports nausea, vomiting, diarrhea that began after eating pizza and her lower abdominal pain worsen.  Denies any fever/chills, dysuria or any other complaints.    Procedure(s) (LRB):  EXPLORATORY-LAPAROTOMY (N/A)  RESECTION-SMALL BOWEL MECKEL'S DIVERTICULUM (N/A)      Indwelling Lines/Drains at time of discharge:   Lines/Drains/Airways          No matching active lines, drains, or airways        Hospital Course: 03/19/2018: POD1: pain controlled with PCA, no emesis  03/20/2018: pain improving and controlled. Had small amount of emesis today. Passing gas and tolerating ice chips.   03/21/2018: +bm/flatus, no nausea, pain controlled. Clear liquid diet  3/22/18 tolerating regular diet, + bowel function, stable and ready for discharge    Consults:   Consults         Status Ordering Provider     Consult to Case Management/Social Work  Once     Provider:  (Not yet assigned)    Completed DINORA LABOY          Significant Diagnostic Studies: none    Pending Diagnostic Studies:     None        Final Active Diagnoses:    Diagnosis Date Noted POA    PRINCIPAL PROBLEM:  Small bowel perforation with abscess due to Meckel's diverticulum s/p small bowel resection  [K63.1]  Yes    Hypokalemia [E87.6] 03/19/2018 Yes    Meckel's diverticulum [Q43.0] 03/18/2018 Not Applicable    GERD (gastroesophageal reflux disease) [K21.9]  03/18/2018 Yes    Migraine [G43.909] 03/18/2018 Yes      Problems Resolved During this Admission:    Diagnosis Date Noted Date Resolved POA    Hypoglycemia [E16.2] 03/20/2018 03/21/2018 Yes      Discharged Condition: good    Disposition: Home or Self Care    Follow Up:  Follow-up Information     Thom Hernandez MD On 4/2/2018.    Specialties:  General Surgery, Bariatrics  Why:  post op  Contact information:  [address] [place] [address] [address] [place] [id number]  [phone]                 Patient Instructions:     Diet Adult Regular     Lifting restrictions     Notify your health care provider if you experience any of the following:  temperature >100.4     Notify your health care provider if you experience any of the following:  persistent nausea and vomiting or diarrhea     Notify your health care provider if you experience any of the following:  severe uncontrolled pain     Notify your health care provider if you experience any of the following:  redness, tenderness, or signs of infection (pain, swelling, redness, odor or green/yellow discharge around incision site)     Notify your health care provider if you experience any of the following:  increased confusion or weakness     Notify your health care provider if you experience any of the following:  persistent dizziness, light-headedness, or visual disturbances     No dressing needed       Medications:  Reconciled Home Medications:      Medication List      START taking these medications    nozaseptin nasal   Commonly known as:  NOZIN  1 application by Each Nare route 2 (two) times daily.        CHANGE how you take these medications    * hydrocodone-acetaminophen 7.5-325mg 7.5-325 mg per tablet  Commonly known as:  NORCO  Take 1 tablet by mouth every 6 (six) hours as needed.  What changed:  Another medication with the same name was added. Make sure you understand how and when to take each.     * hydrocodone-acetaminophen 5-325mg 5-325 mg per tablet  Commonly  known as:  NORCO  Take 1 tablet by mouth every 4 (four) hours as needed.  What changed:  You were already taking a medication with the same name, and this prescription was added. Make sure you understand how and when to take each.        * This list has 2 medication(s) that are the same as other medications prescribed for you. Read the directions carefully, and ask your doctor or other care provider to review them with you.            CONTINUE taking these medications    gabapentin 100 MG capsule  Commonly known as:  NEURONTIN     ondansetron 4 MG tablet  Commonly known as:  ZOFRAN  Take 1 tablet (4 mg total) by mouth every 8 (eight) hours as needed.     pantoprazole 40 MG tablet  Commonly known as:  PROTONIX     tamsulosin 0.4 mg Cp24  Commonly known as:  FLOMAX  Take 1 capsule (0.4 mg total) by mouth once daily.     TOPAMAX ORAL     traMADol 50 mg tablet  Commonly known as:  ULTRAM        STOP taking these medications    ketorolac 10 mg tablet  Commonly known as:  TORADOL           Where to Get Your Medications      These medications were sent to [facility] [address]  [address] [place] [address] [address], [place] [address]    Phone:  [phone]   · hydrocodone-acetaminophen 5-325mg 5-325 mg per tablet  · nozaseptin nasal        Time spent on the discharge of patient: 30 minutes    Thom Hernandez MD  General Surgery  [facility]

## 2018-03-28 ENCOUNTER — NURSE TRIAGE (OUTPATIENT)
Dept: ADMINISTRATIVE | Facility: CLINIC | Age: 58
End: 2018-03-28

## 2018-03-28 ENCOUNTER — TELEPHONE (OUTPATIENT)
Dept: SURGERY | Facility: CLINIC | Age: 58
End: 2018-03-28

## 2018-03-28 NOTE — TELEPHONE ENCOUNTER
Reason for Disposition   Recent antibiotic therapy (i.e., within last 2 months)    Protocols used: ST DIARRHEA-A-[place]    Pt c/o diarrhea that started before she was d/c'd from hospital. 3-4 times yesterday. Pt would like to know when she can expect normal BM's. Please call pt to advise.

## 2018-03-28 NOTE — TELEPHONE ENCOUNTER
----- Message from Demetrio Arambula sent at 3/28/2018  2:18 PM CDT -----  Contact: Pt  X_ 1st Request  _ 2nd Request  _ 3rd Request    Who: CYNDY HERRERA [[id number]]    Why: Returning a call.    What Number to Call Back: [phone]    When to Expect a call back: (Before the end of the day)  -- if call after 3:00 call back will be tomorrow.

## 2018-03-28 NOTE — TELEPHONE ENCOUNTER
Patient is aware that she can take imodium or something like it.  She is upset that she will have to reschedule her appointment next week. She would rather see you then the Pa. She said that he stomach is puffy and that yesterday she had some blood on her shirt

## 2018-04-02 NOTE — PROGRESS NOTES
Larisa Jones is status post exploratory laparotomy with small bowel resection for meckels diverticulitis and presents today for follow-up care.  She is tolerating a regular diet and denies fevers, nausea or vomiting.    PE: Abdomen is soft, non-tender, non-distended.  Incisions clean, dry, and intact.    Pathology report was reviewed with the patient, which showed Small bowel, resection:  Segment of benign small bowel with Meckel's diverticulitis and abscess.  Negative for dysplasia and malignancy..    A/P:  Normal post-operative course.    The patient is instructed to avoid heavy lifting until 4 weeks after the surgery date.  Return to clinic as needed.

## 2018-04-03 ENCOUNTER — OFFICE VISIT (OUTPATIENT)
Dept: SURGERY | Facility: CLINIC | Age: 58
End: 2018-04-03
Payer: COMMERCIAL

## 2018-04-03 VITALS
WEIGHT: 140 LBS | TEMPERATURE: 98 F | BODY MASS INDEX: 25.61 KG/M2 | DIASTOLIC BLOOD PRESSURE: 74 MMHG | HEART RATE: 67 BPM | SYSTOLIC BLOOD PRESSURE: 117 MMHG

## 2018-04-03 DIAGNOSIS — K63.1 SMALL BOWEL PERFORATION: ICD-10-CM

## 2018-04-03 DIAGNOSIS — Z98.890 POST-OPERATIVE STATE: Primary | ICD-10-CM

## 2018-04-03 PROCEDURE — 99024 POSTOP FOLLOW-UP VISIT: CPT | Mod: S$GLB,,, | Performed by: PHYSICIAN ASSISTANT

## 2018-04-03 PROCEDURE — 99999 PR PBB SHADOW E&M-EST. PATIENT-LVL III: CPT | Mod: PBBFAC,,, | Performed by: PHYSICIAN ASSISTANT

## 2018-04-11 ENCOUNTER — TELEPHONE (OUTPATIENT)
Dept: SURGERY | Facility: CLINIC | Age: 58
End: 2018-04-11

## 2018-04-11 NOTE — TELEPHONE ENCOUNTER
----- Message from Jef Julian sent at 4/11/2018  8:59 AM CDT -----  Contact: pt  Call pt at [phone] (home)   Pt needs a statement for insurance company

## 2018-04-17 ENCOUNTER — TELEPHONE (OUTPATIENT)
Dept: SURGERY | Facility: CLINIC | Age: 58
End: 2018-04-17

## 2018-04-17 NOTE — TELEPHONE ENCOUNTER
----- Message from Millie Sierra sent at 4/17/2018  9:56 AM CDT -----  Contact: Pt  Pt returning nurse call, request call back. States she is sick today. .[phone] (home)

## 2018-04-17 NOTE — TELEPHONE ENCOUNTER
Patient is aware that I letter is ready for , but she would rather us mail it to her,    Patient explained how she is having extreme stomach pains for the past three days. She said that she has been vomiting and diarrhea since 2 this morning.  Please advise

## 2018-05-07 ENCOUNTER — OFFICE VISIT (OUTPATIENT)
Dept: SURGERY | Facility: CLINIC | Age: 58
End: 2018-05-07
Payer: COMMERCIAL

## 2018-05-07 VITALS
TEMPERATURE: 100 F | SYSTOLIC BLOOD PRESSURE: 111 MMHG | HEIGHT: 62 IN | WEIGHT: 145.75 LBS | DIASTOLIC BLOOD PRESSURE: 75 MMHG | BODY MASS INDEX: 26.82 KG/M2 | HEART RATE: 75 BPM

## 2018-05-07 DIAGNOSIS — K63.1 SMALL BOWEL PERFORATION: Primary | ICD-10-CM

## 2018-05-07 PROCEDURE — 99999 PR PBB SHADOW E&M-EST. PATIENT-LVL III: CPT | Mod: PBBFAC,,, | Performed by: SURGERY

## 2018-05-07 PROCEDURE — 99024 POSTOP FOLLOW-UP VISIT: CPT | Mod: S$GLB,,, | Performed by: SURGERY

## 2018-05-07 NOTE — PROGRESS NOTES
Subjective:       Patient ID: Larisa Jones is a 57 y.o. female.    Chief Complaint: Post-op Evaluation    HPI   Status post exploratory laparotomy/Meckel's diverticulum resection 03/18/2018 presents for follow-up.   She is doing well today with no complaints.  She reports her loose stools have resolved  Review of Systems    Objective:      Physical Exam   Constitutional: She appears well-developed and well-nourished. No distress.   Abdominal: Soft. She exhibits no distension. There is no tenderness.   Incision well healed       FINAL PATHOLOGIC DIAGNOSIS  Small bowel, resection:  Segment of benign small bowel with Meckel's diverticulitis and abscess.  Negative for dysplasia and malignancy  Assessment:     status post Meckel's diverticulectomy  Plan:       Doing well  Follow-up as needed

## 2019-01-10 NOTE — SUBJECTIVE & OBJECTIVE
Past Medical History:   Diagnosis Date    GERD (gastroesophageal reflux disease)     Headache        Past Surgical History:   Procedure Laterality Date    CHOLECYSTECTOMY      SHOULDER SURGERY         Review of patient's allergies indicates:   Allergen Reactions    Nexium [esomeprazole magnesium] Other (See Comments)     Headache       No current facility-administered medications on file prior to encounter.      Current Outpatient Prescriptions on File Prior to Encounter   Medication Sig    hydrocodone-acetaminophen 7.5-325mg (NORCO) 7.5-325 mg per tablet Take 1 tablet by mouth every 6 (six) hours as needed.    gabapentin (NEURONTIN) 100 MG capsule Take 100 mg by mouth 3 (three) times daily.    ketorolac (TORADOL) 10 mg tablet Take 1 tablet (10 mg total) by mouth every 8 (eight) hours as needed for Pain (pain).    ondansetron (ZOFRAN) 4 MG tablet Take 1 tablet (4 mg total) by mouth every 8 (eight) hours as needed.    pantoprazole (PROTONIX) 40 MG tablet Take 40 mg by mouth once daily.    tamsulosin (FLOMAX) 0.4 mg Cp24 Take 1 capsule (0.4 mg total) by mouth once daily.    TOPIRAMATE (TOPAMAX ORAL) Take by mouth.    tramadol (ULTRAM) 50 mg tablet Take 50 mg by mouth every 6 (six) hours as needed for Pain.     Family History     None        Social History Main Topics    Smoking status: Never Smoker    Smokeless tobacco: Not on file    Alcohol use No    Drug use: No    Sexual activity: Not on file     Review of Systems   Constitutional: Positive for fatigue. Negative for appetite change, chills, diaphoresis, fever and unexpected weight change.   HENT: Negative for congestion, nosebleeds, sinus pressure and sore throat.    Eyes: Negative for pain, discharge and visual disturbance.   Respiratory: Negative for cough, chest tightness, shortness of breath, wheezing and stridor.    Cardiovascular: Negative for chest pain, palpitations and leg swelling.   Gastrointestinal: Positive for abdominal pain.  Negative for abdominal distention, blood in stool, constipation, diarrhea, nausea and vomiting.   Endocrine: Negative for cold intolerance and heat intolerance.   Genitourinary: Negative for difficulty urinating, dysuria, flank pain, frequency and urgency.   Musculoskeletal: Negative for arthralgias, back pain, joint swelling, myalgias, neck pain and neck stiffness.   Skin: Negative for rash and wound.   Allergic/Immunologic: Negative for food allergies and immunocompromised state.   Neurological: Negative for dizziness, seizures, syncope, facial asymmetry, speech difficulty, weakness, light-headedness, numbness and headaches.   Hematological: Negative for adenopathy.   Psychiatric/Behavioral: Negative for agitation, confusion and hallucinations.     Objective:     Vital Signs (Most Recent):  Temp: 98.3 °F (36.8 °C) (03/19/18 1241)  Pulse: 70 (03/19/18 1349)  Resp: 18 (03/19/18 1241)  BP: (!) 107/58 (03/19/18 1241)  SpO2: 97 % (03/19/18 1241) Vital Signs (24h Range):  Temp:  [97.5 °F (36.4 °C)-99.1 °F (37.3 °C)] 98.3 °F (36.8 °C)  Pulse:  [57-80] 70  Resp:  [14-22] 18  SpO2:  [96 %-100 %] 97 %  BP: ()/(52-76) 107/58     Weight: 63.5 kg (140 lb)  Body mass index is 25.61 kg/m².    Physical Exam   Constitutional: She is oriented to person, place, and time. She appears well-developed and well-nourished. No distress.   HENT:   Head: Normocephalic and atraumatic.   Nose: Nose normal.   Mouth/Throat: Oropharynx is clear and moist.   Eyes: Conjunctivae are normal. No scleral icterus.   Neck: Normal range of motion. Neck supple. No tracheal deviation present.   Cardiovascular: Normal rate, regular rhythm, normal heart sounds and intact distal pulses.  Exam reveals no gallop and no friction rub.    No murmur heard.  Pulmonary/Chest: Effort normal and breath sounds normal. No stridor. No respiratory distress. She has no wheezes. She has no rales. She exhibits no tenderness.   Abdominal: Soft. Bowel sounds are normal.  She exhibits no distension and no mass. There is tenderness (Incisional ). There is no rebound and no guarding.   Musculoskeletal: Normal range of motion. She exhibits no edema, tenderness or deformity.   Neurological: She is alert and oriented to person, place, and time. No cranial nerve deficit. She exhibits normal muscle tone. Coordination normal.   Skin: Skin is warm and dry. No rash noted. She is not diaphoretic. No erythema. No pallor.   Psychiatric: She has a normal mood and affect. Her behavior is normal. Thought content normal.           Significant Labs:   BMP:   Recent Labs  Lab 03/18/18  0930 03/19/18  0852   GLU 96 78    138   K 3.8 3.4*    109   CO2 22* 20*   BUN 12 8   CREATININE 0.8 0.6   CALCIUM 9.1 8.3*   MG 2.2  --      CBC:   Recent Labs  Lab 03/18/18  0930 03/19/18  0852   WBC 14.66* 11.21   HGB 15.1 12.4   HCT 43.4 37.1   * 149*     CMP:   Recent Labs  Lab 03/18/18  0930 03/19/18  0852    138   K 3.8 3.4*    109   CO2 22* 20*   GLU 96 78   BUN 12 8   CREATININE 0.8 0.6   CALCIUM 9.1 8.3*   PROT 6.8  --    ALBUMIN 3.7  --    BILITOT 0.8  --    ALKPHOS 75  --    AST 20  --    ALT 12  --    ANIONGAP 7* 9   EGFRNONAA >60 >60     All pertinent labs within the past 24 hours have been reviewed.    Significant Imaging:  Imaging Results          CT Abdomen Pelvis With Contrast (Final result)  Result time 03/18/18 15:14:22    Final result by Stuart Siegel MD (03/18/18 15:14:22)                 Impression:           Small thick walled fluid collection measuring 2.1 x 3.2 x 2.9 cm in the right hemipelvis consistent with an abscess.  Adjacent mildly dilated thick walled loop of bowel containing enteric contrast with 2 abnormal communications to the adjacent ileum.  Differential considerations include focal contained perforation/fistula formation secondary to inflammatory bowel disease or surgery.      All CT scans at this facility use dose modulation, iterative reconstruction  and/or weight based dosing when appropriate to reduce radiation dose to as low as reasonably achievable.   Electronically signed by: DEVIN SIEGEL MD  Date:     03/18/18  Time:    15:14              Narrative:    Exam: CT ABDOMEN PELVIS WITH CONTRAST  Technique: Axial CT imaging was performed through the abdomen and pelvis with  75cc  of intravenous contrast. Multiplanar reformats were performed and interpreted.  Enteric contrast was utilized.  Clinical History:  Abdominal pain.  Infection, abdomen-pelvis right pelvis abscess ? bowel perforation     Comparison: CT abdomen and pelvis and pelvic ultrasound performed earlier today.  Findings:       There is a thickwalled fluid collection containing a bubble of air again demonstrated in the right hemipelvis dense abuts a mildly distended loop, thick walled loop of small bowel containing enteric contrast that has an abnormal configuration and communication with the adjacent small bowel loops.  The collection measures 2.1 x 3.2 x 2.9 cm most consistent with an abscess.  The measurements of the collection on the previous CT exam included the dilated loop of small bowel which is contiguous with the abscess.  The mildly dilated thick walled bowel mentioned above measures approximately 5 cm in length and appears to have 2 fistulous communications with the adjacent normal ileum.  Adjacent surgical clips in the right hemipelvis.  The terminal ileum is within normal limits.                             US Pelvis Complete Non OB (Final result)  Result time 03/18/18 12:52:59    Final result by Devin Siegel MD (03/18/18 12:52:59)                 Impression:     Bowel gas limits evaluation.  The thickwalled fluid collection in the right hemipelvis was identified.  Electronically signed by: DEVIN SIEGEL MD  Date:     03/18/18  Time:    12:52              Narrative:    Exam: US PELVIS COMPLETE NON OB  Clinical History:    Abdominal pain.  Abnormal CT examination.  Findings:     The  uterus is normal in size and echotexture with a normal 3 mm endometrial stripe.  1.3 cm intramural posterior uterine wall fibroid.  The right ovary and adnexa was visualized secondary to bowel gas.  Fluid-filled structure with peristalsis adjacent to the left ovary appears to represent a small bowel loop.  The left ovary measures 2.4 x 2.4 x 2.8 cm.  No free pelvic fluid.                             CT Renal Stone Study ABD Pelvis WO (Final result)  Result time 03/18/18 11:26:37    Final result by Devin Siegel MD (03/18/18 11:26:37)                 Impression:           Thickwalled complex fluid collection in the right hemipelvis containing air measuring 4.0 x 5.5 cm suspicious for an abscess.  Differential considerations include a tubo-ovarian abscess or focal small bowel perforation with abscess.  Recommend a repeat study with enteric contrast following at least a 90 minute delay.  Also, an ultrasound may be helpful to evaluate the fluid collections relationship with the right ovary.      All CT scans at this facility use dose modulation, iterative reconstruction and/or weight based dosing when appropriate to reduce radiation dose to as low as reasonably achievable.   Electronically signed by: DEVIN SIEGEL MD  Date:     03/18/18  Time:    11:26              Narrative:    Exam: CT RENAL STONE STUDY ABD PELVIS WO  Technique: Axial CT images performed through the abdomen and pelvis without intravenous contrast. Multiplanar reformats were performed and interpreted.  Comparison: CT abdomen on October 9, 2016  Clinical History: Abdominal pain. Flank pain, stone disease suspected Hematuria      Findings:       Lung bases are clear.  No urolithiasis, hydronephrosis, or perinephric stranding.  Mildly complex right interpolar renal cyst measuring up to 7 cm.  No hydronephrosis or urolithiasis.  The liver, spleen, adrenal glands, and pancreas have a normal noncontrasted appearance.   The gallbladder is unremarkable.  No  free fluid, free air.  The bowel is nondistended and within normal limits.  The appendix is normal.  In the right hemipelvis, there is a thick walled complex fluid collection measuring 4.0 x 5.5 cm containing bubbles of air that is contiguous with the right ovary.  A loop of small bowel is contiguous with the fluid collection.  The fluid collection also abuts the fundus of the uterus.  The abdominal aorta is normal in caliber.  The urinary bladder is unremarkable.     No significant osseous abnormality is identified.                             Patient/Caregiver provided printed discharge information.

## (undated) DEVICE — CUTTER PROXIMATE BLUE 75MM

## (undated) DEVICE — SUT VICRYL PLUS 3-0 SH 18IN

## (undated) DEVICE — SEE MEDLINE ITEM 157148

## (undated) DEVICE — ELECTRODE BLD EXT 6.50 ST DISP

## (undated) DEVICE — SPONGE LAP 18X18 PREWASHED

## (undated) DEVICE — SUT 1 48IN PDS II VIO MONO

## (undated) DEVICE — DEVICE ENSEAL X1 LARGE JAW

## (undated) DEVICE — TAPE SURG MEDIPORE 6X72IN

## (undated) DEVICE — PAD ABD 8X10 STERILE

## (undated) DEVICE — SUT SILK 3-0 STRANDS 30IN

## (undated) DEVICE — SUT VICRYL 3-0 27 SH

## (undated) DEVICE — GAUZE SPONGE 4X4 12PLY

## (undated) DEVICE — Device

## (undated) DEVICE — EVACUATOR PENCIL SMOKE NEPTUNE

## (undated) DEVICE — SUT 1 36IN PDS II VIO MONO

## (undated) DEVICE — COVER OVERHEAD SURG LT BLUE

## (undated) DEVICE — SUT SILK 0 SUTUPAK SA86H

## (undated) DEVICE — SEE MEDLINE ITEM 157117

## (undated) DEVICE — GLOVE 7.0 PROTEXIS PI BLUE

## (undated) DEVICE — SEE MEDLINE ITEM 146345

## (undated) DEVICE — SUT SILK 2-0 STRANDS 30IN

## (undated) DEVICE — RELOAD PROXIMATE CUT BLUE 75MM

## (undated) DEVICE — SEE MEDLINE ITEM 157027

## (undated) DEVICE — SUT 2/0 30IN SILK BLK BRAI

## (undated) DEVICE — POSITIONER HEAD DONUT 9IN FOAM

## (undated) DEVICE — SUPPORT ULNA NERVE PROTECTOR

## (undated) DEVICE — ELECTRODE REM PLYHSV RETURN 9

## (undated) DEVICE — WARMER DRAPE STERILE LF

## (undated) DEVICE — GLOVE PROTEXIS HYDROGEL SZ7

## (undated) DEVICE — SEE MEDLINE ITEM 152622

## (undated) DEVICE — SUT SILK 3-0 SH 18IN BLACK

## (undated) DEVICE — MANIFOLD 4 PORT

## (undated) DEVICE — APPLICATOR CHLORAPREP ORN 26ML

## (undated) DEVICE — SOL 9P NACL IRR PIC IL